# Patient Record
Sex: FEMALE | Race: BLACK OR AFRICAN AMERICAN | Employment: FULL TIME | URBAN - METROPOLITAN AREA
[De-identification: names, ages, dates, MRNs, and addresses within clinical notes are randomized per-mention and may not be internally consistent; named-entity substitution may affect disease eponyms.]

---

## 2017-08-09 ENCOUNTER — HOSPITAL ENCOUNTER (OUTPATIENT)
Dept: NUTRITION | Age: 62
Discharge: HOME | End: 2017-08-09
Payer: COMMERCIAL

## 2017-08-09 PROCEDURE — 97802 MEDICAL NUTRITION INDIV IN: CPT

## 2017-08-09 NOTE — PROGRESS NOTES
[address] [place] [address] 51, 45 [facility], [place], 70 [facility]  Phone: [phone]  Fax: [phone]   Nutrition Assessment - Medical Nutrition Therapy   Outpatient Initial Evaluation         Patient Name: Tina Riedel : 1955   Treatment Diagnosis: Type 2 Diabetes     Referral Source: Carmen aWll MD Start of Care [facility]): 2017     Ht:  64.75 in  cm Wt:  175 lb  kg   BMI: 29.3  BMR   male  BMR female 12     PMHx includes: Hypothyroidism, hyperlipidemia, HTN     Medications of Nutritional Significance:   Losartan, Vitamin D, Mag-oxide, metformin, pioglitazone, rosuvastatin, pantoprazole, levothyroixine     Subjective/Assessment:   62 YO female referred to RD for diabetes. Per BMI, pt is considered overweight. Most recent A1c increased to 6.4%, still within goal for DM. She has had DM for about 5 years and took one education class when she was first diagnosed. Pt reports she has been fairly wt stable. 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 She has a health  who recently sent her exercise videos and pt plans to start them once she receives them. She tries to check her BG every morning, which ranges 125-135, but no other time during the day. Suspect her BG are higher later in the day based on her dietary intake. Pt expressed comprehension, motivation, and compliance is expected. Current Eating Patterns: Pt is currently on summer break, and therefore, she has no routine or schedule. She eats at different times each day. Her meals are very imbalanced and are high in carbs usually, such as 1.5 cups of red beans & rice, half an ear of corn with butter, a handful of cherries and grapes, plus 8 oz of lemonade. Yesterday she had gavin chips with cheese and 16 oz of sweet tea at the movie theaters. She usually drinks lemonade throughout the day too.       Handouts/  Information Provided: [x] Carbohydrates  [x]  Protein  []  Fiber  [x]  Serving Sizes  [x]  Snack Ideas  []  Food Journals [x]  Diabetes  [x]  List of non-starchy vegetables  []  Sodium  [x]  Gen Nutr Guidelines  []  SBGM Guidelines  [x]  Others: Educated pt on the pathophysiology of Type II Diabetes and insulin resistance and the rationale for dietary modification and increased activity. Educated pt on carbohydrate food sources, counting carbohydrates, and meal timing. Discussed the Healthy Plate Method and appropriate portion sizes of different food groups. Explained the importance of combining carbohydrates with protein at meals and reviewed foods that contain each nutrient. Explained calorie density and empty calories to assist pt with understanding portion sizes and limiting excess calories. Created meal/snack time schedule based her schedule. Estimate Needs:   Calories:  1500 Protein: 94 Carbs: 169 Fat: 50   Kcal/day  g/day  g/day  g/day        percent: 25  45  30     Nutritional Goal - To promote lifestyle changes to result in:    [x]  Weight loss  [x]  Improved blood sugar control  []  Improved cholesterol levels  []  Decreased blood pressure  []  Weight maintenance []  Preventing any interactions associated with food allergies  []  Adequate weight gain toward goal weight  []  Other:        Recommendations: - Pt will combine carbohydrates with a protein source at every meal and snack.  - Pt will eat within 2 hours of waking and eat a meal every 4-5 hours while awake. If longer than 5 hours between meals, pt will have a snack. Avoid skipping meals.  - Pt will start exercise videos once they arrive - at least 1 day per week to start.      Information Reviewed with: pt   Potential Barriers to Learning: none     Dietitian Signature: Jesus Gayle RD Date: 8/9/2017   Follow-up: Wed, 9/6/17 @5pm Time: 2:11 PM

## 2017-09-06 ENCOUNTER — APPOINTMENT (OUTPATIENT)
Dept: NUTRITION | Age: 62
End: 2017-09-06

## 2022-10-11 LAB — MAMMOGRAPHY, EXTERNAL: NORMAL

## 2022-12-16 LAB
CREATININE, EXTERNAL: 0.81
HBA1C MFR BLD HPLC: 6.6 %
LDL CHOLESTEROL, EXTERNAL: 110

## 2022-12-17 LAB — MICROALBUMIN URINE, EXTERNAL: <1.2

## 2023-01-09 ENCOUNTER — OFFICE VISIT (OUTPATIENT)
Dept: FAMILY MEDICINE CLINIC | Age: 68
End: 2023-01-09
Payer: MEDICARE

## 2023-01-09 VITALS
HEART RATE: 77 BPM | WEIGHT: 177 LBS | BODY MASS INDEX: 29.49 KG/M2 | SYSTOLIC BLOOD PRESSURE: 133 MMHG | HEIGHT: 65 IN | DIASTOLIC BLOOD PRESSURE: 77 MMHG | TEMPERATURE: 97.7 F | RESPIRATION RATE: 16 BRPM | OXYGEN SATURATION: 98 %

## 2023-01-09 DIAGNOSIS — E78.5 DYSLIPIDEMIA, GOAL LDL BELOW 70: ICD-10-CM

## 2023-01-09 DIAGNOSIS — I10 HTN, GOAL BELOW 140/90: ICD-10-CM

## 2023-01-09 DIAGNOSIS — C50.912 MALIGNANT NEOPLASM OF LEFT FEMALE BREAST, UNSPECIFIED ESTROGEN RECEPTOR STATUS, UNSPECIFIED SITE OF BREAST (HCC): ICD-10-CM

## 2023-01-09 DIAGNOSIS — N20.0 KIDNEY STONES: ICD-10-CM

## 2023-01-09 DIAGNOSIS — E03.9 ACQUIRED HYPOTHYROIDISM: ICD-10-CM

## 2023-01-09 DIAGNOSIS — Z76.89 ESTABLISHING CARE WITH NEW DOCTOR, ENCOUNTER FOR: Primary | ICD-10-CM

## 2023-01-09 DIAGNOSIS — E11.9 DIABETES MELLITUS TYPE 2, NONINSULIN DEPENDENT (HCC): ICD-10-CM

## 2023-01-09 PROCEDURE — 2022F DILAT RTA XM EVC RTNOPTHY: CPT | Performed by: STUDENT IN AN ORGANIZED HEALTH CARE EDUCATION/TRAINING PROGRAM

## 2023-01-09 PROCEDURE — G8536 NO DOC ELDER MAL SCRN: HCPCS | Performed by: STUDENT IN AN ORGANIZED HEALTH CARE EDUCATION/TRAINING PROGRAM

## 2023-01-09 PROCEDURE — 1123F ACP DISCUSS/DSCN MKR DOCD: CPT | Performed by: STUDENT IN AN ORGANIZED HEALTH CARE EDUCATION/TRAINING PROGRAM

## 2023-01-09 PROCEDURE — 1101F PT FALLS ASSESS-DOCD LE1/YR: CPT | Performed by: STUDENT IN AN ORGANIZED HEALTH CARE EDUCATION/TRAINING PROGRAM

## 2023-01-09 PROCEDURE — G8427 DOCREV CUR MEDS BY ELIG CLIN: HCPCS | Performed by: STUDENT IN AN ORGANIZED HEALTH CARE EDUCATION/TRAINING PROGRAM

## 2023-01-09 PROCEDURE — 1090F PRES/ABSN URINE INCON ASSESS: CPT | Performed by: STUDENT IN AN ORGANIZED HEALTH CARE EDUCATION/TRAINING PROGRAM

## 2023-01-09 PROCEDURE — G9899 SCRN MAM PERF RSLTS DOC: HCPCS | Performed by: STUDENT IN AN ORGANIZED HEALTH CARE EDUCATION/TRAINING PROGRAM

## 2023-01-09 PROCEDURE — G8510 SCR DEP NEG, NO PLAN REQD: HCPCS | Performed by: STUDENT IN AN ORGANIZED HEALTH CARE EDUCATION/TRAINING PROGRAM

## 2023-01-09 PROCEDURE — 3017F COLORECTAL CA SCREEN DOC REV: CPT | Performed by: STUDENT IN AN ORGANIZED HEALTH CARE EDUCATION/TRAINING PROGRAM

## 2023-01-09 PROCEDURE — G8417 CALC BMI ABV UP PARAM F/U: HCPCS | Performed by: STUDENT IN AN ORGANIZED HEALTH CARE EDUCATION/TRAINING PROGRAM

## 2023-01-09 PROCEDURE — G8400 PT W/DXA NO RESULTS DOC: HCPCS | Performed by: STUDENT IN AN ORGANIZED HEALTH CARE EDUCATION/TRAINING PROGRAM

## 2023-01-09 PROCEDURE — 3078F DIAST BP <80 MM HG: CPT | Performed by: STUDENT IN AN ORGANIZED HEALTH CARE EDUCATION/TRAINING PROGRAM

## 2023-01-09 PROCEDURE — 99205 OFFICE O/P NEW HI 60 MIN: CPT | Performed by: STUDENT IN AN ORGANIZED HEALTH CARE EDUCATION/TRAINING PROGRAM

## 2023-01-09 PROCEDURE — 3046F HEMOGLOBIN A1C LEVEL >9.0%: CPT | Performed by: STUDENT IN AN ORGANIZED HEALTH CARE EDUCATION/TRAINING PROGRAM

## 2023-01-09 PROCEDURE — 3075F SYST BP GE 130 - 139MM HG: CPT | Performed by: STUDENT IN AN ORGANIZED HEALTH CARE EDUCATION/TRAINING PROGRAM

## 2023-01-09 RX ORDER — METFORMIN HYDROCHLORIDE 1000 MG/1
1000 TABLET ORAL 2 TIMES DAILY WITH MEALS
Qty: 180 TABLET | Refills: 3 | Status: SHIPPED | OUTPATIENT
Start: 2023-01-09 | End: 2023-01-09

## 2023-01-09 RX ORDER — LOSARTAN POTASSIUM AND HYDROCHLOROTHIAZIDE 25; 100 MG/1; MG/1
1 TABLET ORAL DAILY
Qty: 90 TABLET | Refills: 3 | Status: SHIPPED | OUTPATIENT
Start: 2023-01-09 | End: 2023-01-09

## 2023-01-09 RX ORDER — LETROZOLE 2.5 MG/1
2.5 TABLET, FILM COATED ORAL DAILY
COMMUNITY
Start: 2022-11-18

## 2023-01-09 RX ORDER — LEVOTHYROXINE SODIUM 100 UG/1
100 TABLET ORAL
Qty: 90 TABLET | Refills: 1 | Status: SHIPPED | OUTPATIENT
Start: 2023-01-09

## 2023-01-09 RX ORDER — LEVOTHYROXINE SODIUM 100 UG/1
100 TABLET ORAL
Qty: 90 TABLET | Refills: 1 | Status: SHIPPED | OUTPATIENT
Start: 2023-01-09 | End: 2023-01-09

## 2023-01-09 RX ORDER — CHOLECALCIFEROL (VITAMIN D3) 125 MCG
2000 CAPSULE ORAL DAILY
COMMUNITY

## 2023-01-09 RX ORDER — LOSARTAN POTASSIUM AND HYDROCHLOROTHIAZIDE 25; 100 MG/1; MG/1
1 TABLET ORAL DAILY
Qty: 90 TABLET | Refills: 3 | Status: SHIPPED | OUTPATIENT
Start: 2023-01-09

## 2023-01-09 RX ORDER — CALCIUM CARBONATE/VITAMIN D3 500-10/5ML
LIQUID (ML) ORAL
COMMUNITY

## 2023-01-09 RX ORDER — METFORMIN HYDROCHLORIDE 1000 MG/1
1000 TABLET ORAL 2 TIMES DAILY WITH MEALS
Qty: 180 TABLET | Refills: 3 | Status: SHIPPED | OUTPATIENT
Start: 2023-01-09

## 2023-01-09 NOTE — PROGRESS NOTES
Evie Hardy is a 74 y.o. female (: 1955) presenting to address:    Chief Complaint   Patient presents with    Establish Care       Vitals:    23 1324   BP: 133/77   Pulse: 77   Resp: 16   Temp: 97.7 °F (36.5 °C)   TempSrc: Temporal   SpO2: 98%   Weight: 177 lb (80.3 kg)   Height: 5' 5\" (1.651 m)   PainSc:   0 - No pain       Hearing/Vision:   No results found. Learning Assessment:     Learning Assessment 2023   PRIMARY LEARNER Patient   HIGHEST LEVEL OF EDUCATION - PRIMARY LEARNER  2 YEARS OF COLLEGE   BARRIERS PRIMARY LEARNER NONE   CO-LEARNER CAREGIVER No   PRIMARY LANGUAGE ENGLISH   LEARNER PREFERENCE PRIMARY VIDEOS     LISTENING   ANSWERED BY Patient   RELATIONSHIP SELF     Depression Screening:     3 most recent PHQ Screens 2023   Little interest or pleasure in doing things Not at all   Feeling down, depressed, irritable, or hopeless Not at all   Total Score PHQ 2 0     Fall Risk Assessment:     Fall Risk Assessment, last 12 mths 2023   Able to walk? Yes   Fall in past 12 months? 0   Do you feel unsteady? 0   Are you worried about falling 0     Abuse Screening:     Abuse Screening Questionnaire 2023   Do you ever feel afraid of your partner? N   Are you in a relationship with someone who physically or mentally threatens you? N   Is it safe for you to go home? Y     ADL Assessment:   No flowsheet data found. Coordination of Care Questionaire:   1. \"Have you been to the ER, urgent care clinic since your last visit? Hospitalized since your last visit? \" No    2. \"Have you seen or consulted any other health care providers outside of the [address] [place] [address] since your last visit? \" No     3. For patients aged 39-70: Has the patient had a colonoscopy / FIT/ Cologuard? No      If the patient is female:    4. For patients aged 41-77: Has the patient had a mammogram within the past 2 years? Yes - no Care Gap present  See top three    5.  For patients aged 21-65: Has the patient had a pap smear? No    Advanced Directive:   1. Do you have an Advanced Directive? NO    2. Would you like information on Advanced Directives?  NO

## 2023-01-09 NOTE — PATIENT INSTRUCTIONS
I think this is an opportunity to consider adding on a treatment for diabetes called GLP 1 agonist. Some medications in this group have been shown to have good effect on A1C but more importantly have also shown cardiovascular, weight loss, and renal protection benefits. Every insurance is different and they may request we use a different medicine from this class but I can send a script for you to the pharmacy if you'd like to start. All but one (Rybelsus) of this group of medications are injectables, however most are only once weekly. You can alternate the injection sites (see image below.) If you have any questions or concerns we can schedule a visit to review. Choosing your injection site  Be sure to use a different spot in that area every time        If you experience nausea, you may find the following tips helpful:    Eat smaller meals  Try splitting your 3 daily meals into 4 or more smaller ones  Stop eating when you feel full  Avoid fat or fatty foods  Try eating bland foods like toast, crackers, or rice    Diabetes drugs in the GLP-1 agonists class include:  Dulaglutide (Trulicity), taken by injection weekly  Exenatide extended release (Bydureon), taken by injection weekly  Exenatide (Byetta), taken by injection twice daily  Semaglutide (Ozempic), taken by injection weekly  Semaglutide (Rybelsus), taken by mouth once daily  Liraglutide (Victoza), taken by injection daily  Lixisenatide (Adlyxin), taken by injection daily     . The Big Picture: Checking Your Blood Sugar    Blood sugar (blood glucose) monitoring is the primary tool you have to find out if your blood glucose levels are within your target range. This tells you your blood glucose level at any one time. Its important for blood sugar levels to stay in a healthy range. If glucose levels get too low, we can lose the ability to think and function normally.  If they get too high and stay high, it can cause damage or complications to the body over the course of many years. The logging of your results is vital. When you bring your log to your healthcare provider, youll  have a good picture of your body's response to your diabetes care plan. To help keep track of your levels, we have a printable blood glucose log. We also have a blood glucose log available for purchase that is smaller so you can carry it with you. Who should check? Talk to your doctor about whether you should be checking your blood glucose. People who may benefit from checking blood glucose regularly include those:  taking insulin. who are pregnant.  having a hard time controlling blood sugar levels. having low blood sugar levels. having low blood sugar levels without the usual warning signs. have ketones from high blood sugar levels. Other tips for checking:    With some meters, you can also use your forearm, thigh, or fleshy part of your hand. There are spring-loaded lancing devices that make sticking yourself less painful. If you use your fingertip, stick the side of your fingertip by your fingernail to avoid having sore spots on the frequently used part of your finger. What are the target ranges? A1C targets differ based on age and health. Also, more or less stringent glycemic goals may be appropriate for each individual. Blood glucose targets are individualized based on:  duration of diabetes  age/life expectancy  conditions a person may have  cardiovascular disease or diabetes complications  hypoglycemia unawareness  individual patient considerations    The American Diabetes Association suggests the following targets for most nonpregnant adults with diabetes:    A1C: Less than 7%. Before a meal (preprandial plasma glucose):  mg/dL    1-2 hours after beginning of the meal (postprandial plasma glucose)*: Less than 180 mg/dL    What do my results mean?     When you finish the blood sugar check, write down your results and note what factors may have affected them, such as food, activity, and stress. Take a close look at your blood glucose record to see if your level is too high or too low several days in a row at about the same time. If the same thing keeps happening, it might be time to change your diabetes care plan. Work with your doctor or diabetes educator to learn what your results mean for you. It can take time to make adjustments and get things just right. And do ask your doctor if you should report results out of a certain range right away by phone. Keep in mind that blood glucose results often trigger strong feelings. Blood sugar numbers can leave you upset, confused, frustrated, angry, or down. It's easy to use the numbers to  yourself.  Remind yourself that tracking your blood sugar level is simply a way to know how well your diabetes care plan is working, and whether that plan may need to change. : )

## 2023-01-09 NOTE — PROGRESS NOTES
Yovani Mckenzie (: 1955) is a 74 y.o. female, NEW TO PROVIDER, here to establish care for:    ICD-10-CM ICD-9-CM   1. Establishing care with new doctor, encounter for  ZRene.Fredy V65.8   2. Diabetes mellitus type 2, noninsulin dependent (HCC)  E11.9 250.00   3. HTN, goal below 140/90  I10 401.9   4. Acquired hypothyroidism  E03.9 244.9   5. Malignant neoplasm of left female breast, unspecified estrogen receptor status, unspecified site of breast (HCC)  C50.912 174.9   6. Kidney stones  N20.0 592.0   7. Dyslipidemia, goal LDL below 70  E78.5 272.4     Assessment   Plan     Established with Specialist for #L Breast CA dx  - s/p L mastectomy; No chemo/radiation  Follows q3mo and on Letrozole     #HTN - Goal BP <140/90  #Kidney stones   Previously seen by Cardiology   Rec review Echo 2017 EF 70%, mild aortic sclerosis no stenosis, abnormal diastolic filling   Interested in reduction of pills; Plan to switch to Losartan-HCTZ combo, increase HCTZ dose (reduce kidney stone risk), and D/C Amlodipine   Future consideration: Reevaluate BB need    BP Readings from Last 3 Encounters:   23 133/77     Key CAD CHF Meds               losartan-hydroCHLOROthiazide (HYZAAR) 100-25 mg per tablet (Taking) Take 1 Tablet by mouth daily. Indications: high blood pressure, kidney protection    atenoloL (TENORMIN) 50 mg tablet (Taking) atenolol 50 mg tablet   Take 1 tablet every day by oral route. aspirin 81 mg chewable tablet (Taking) Take 81 mg by mouth daily. rosuvastatin (CRESTOR) 5 mg tablet (Taking)           #HLD - Goal LDL < 70  Unclear control, will check levels'  Tried multiple statins that caused leg cramping and had to stop; Interested in ruling out statin contributing to myalgias, plan to reduce dose to qOther day, monitor, and reintroduce)   Future considerations: Add on zetia    #NIDDM2 - Goal A1C <7  Glucometer? Has at home  Hypoglycemia?  Denies  Interested in starting GLP agonist depending on coverage. Plan to send Ozempic and titrate qMonthly until max tolerated dose. Reviewed can DC pioglitazone when able to start GLP agonist. Back up option of Trulicity then SGLT2 inhibitor if needed. Encouraged to have annual foot exam to r/o peripheral neuropathy. Encouraged to update dilated eye exam annually to r/o retinopathy. Key Antihyperglycemic Medications               metFORMIN (GLUCOPHAGE) 1,000 mg tablet (Taking) Take 1 Tablet by mouth two (2) times daily (with meals). Indications: type 2 diabetes mellitus    semaglutide (OZEMPIC) 0.25 mg or 0.5 mg/dose (2 mg/1.5 ml) subq pen (Taking) Initial: 0.25 mg once weekly for 4 weeks, then increase to 0.5 mg once weekly; If changing the day of administration is necessary, allow at least 48 hrs between doses. Indications: type 2 diabetes mellitus    pioglitazone (ACTOS) 15 mg tablet (Taking) pioglitazone 15 mg tablet   TAKE 1 TABLET BY MOUTH EVERY DAY AT BEDTIME          #Hypothyroidism  Unclear control, will check levels  Tolerating medications without notable AE, will continue regimen unchanged    HM  Colon CA screening - Scheduled for Colonoscopy         Orders Placed This Encounter    letrozole (FEMARA) 2.5 mg tablet     Sig: Take 2.5 mg by mouth daily. cholecalciferol, vitamin D3, 50 mcg (2,000 unit) tab     Sig: Take 2,000 Units by mouth daily. magnesium oxide 400 mg magnesium cap     Sig: Take  by mouth. DISCONTD: semaglutide (OZEMPIC) 0.25 mg or 0.5 mg/dose (2 mg/1.5 ml) subq pen     Sig: Initial: 0.25 mg once weekly for 4 weeks, then increase to 0.5 mg once weekly; If changing the day of administration is necessary, allow at least 48 hrs between doses. Indications: type 2 diabetes mellitus     Dispense:  1 Box     Refill:  0     Will increase dose with next refill if tolerating well. If cost is too high, I will send Trulicity.     DISCONTD: metFORMIN (GLUCOPHAGE) 1,000 mg tablet     Sig: Take 1 Tablet by mouth two (2) times daily (with meals). Indications: type 2 diabetes mellitus     Dispense:  180 Tablet     Refill:  3     Patient newly established with me as PCP for chronic meds    DISCONTD: levothyroxine (SYNTHROID) 100 mcg tablet     Sig: Take 1 Tablet by mouth Daily (before breakfast). Indications: a condition with low thyroid hormone levels     Dispense:  90 Tablet     Refill:  1    DISCONTD: losartan-hydroCHLOROthiazide (HYZAAR) 100-25 mg per tablet     Sig: Take 1 Tablet by mouth daily. Indications: high blood pressure, kidney protection     Dispense:  90 Tablet     Refill:  3    levothyroxine (SYNTHROID) 100 mcg tablet     Sig: Take 1 Tablet by mouth Daily (before breakfast). Indications: a condition with low thyroid hormone levels     Dispense:  90 Tablet     Refill:  1    losartan-hydroCHLOROthiazide (HYZAAR) 100-25 mg per tablet     Sig: Take 1 Tablet by mouth daily. Indications: high blood pressure, kidney protection     Dispense:  90 Tablet     Refill:  3    metFORMIN (GLUCOPHAGE) 1,000 mg tablet     Sig: Take 1 Tablet by mouth two (2) times daily (with meals). Indications: type 2 diabetes mellitus     Dispense:  180 Tablet     Refill:  3     Patient newly established with me as PCP for chronic meds    semaglutide (OZEMPIC) 0.25 mg or 0.5 mg/dose (2 mg/1.5 ml) subq pen     Sig: Initial: 0.25 mg once weekly for 4 weeks, then increase to 0.5 mg once weekly; If changing the day of administration is necessary, allow at least 48 hrs between doses. Indications: type 2 diabetes mellitus     Dispense:  1 Box     Refill:  0     Will increase dose with next refill if tolerating well. If cost is too high, I will send [facility]. Return in about 2 months (around 3/9/2023) for labs 1 week prior or AT visit, bring all meds, 45 min follow up. Subjective   Extensive review of medical history and medications completed to facilitate transfer of care.       Current Outpatient Medications   Medication Instructions    acyclovir (ZOVIRAX) 400 mg, Oral, DAILY    aspirin 81 mg, Oral, DAILY    atenoloL (TENORMIN) 50 mg tablet atenolol 50 mg tablet   Take 1 tablet every day by oral route. calcium carbonate (TUMS) 500 mg, Oral    cholecalciferol (vitamin D3) 2,000 Units, Oral, DAILY    ibuprofen (MOTRIN) 800 mg tablet ibuprofen 800 mg tablet   TAKE 1 TABLET BY MOUTH THREE TIMES DAILY AS NEEDED    letrozole (FEMARA) 2.5 mg, Oral, DAILY    levothyroxine (SYNTHROID) 100 mcg, Oral, DAILY BEFORE BREAKFAST    losartan-hydroCHLOROthiazide (HYZAAR) 100-25 mg per tablet 1 Tablet, Oral, DAILY    magnesium oxide 400 mg magnesium cap Oral    metFORMIN (GLUCOPHAGE) 1,000 mg, Oral, 2 TIMES DAILY WITH MEALS    pioglitazone (ACTOS) 15 mg tablet pioglitazone 15 mg tablet   TAKE 1 TABLET BY MOUTH EVERY DAY AT BEDTIME    potassium chloride SR (KLOR-CON 10) 10 mEq tablet potassium chloride ER 10 mEq tablet,extended release   Take 1 tablet every day by oral route. rosuvastatin (CRESTOR) 5 mg tablet No dose, route, or frequency recorded. semaglutide (OZEMPIC) 0.25 mg or 0.5 mg/dose (2 mg/1.5 ml) subq pen Initial: 0.25 mg once weekly for 4 weeks, then increase to 0.5 mg once weekly; If changing the day of administration is necessary, allow at least 48 hrs between doses.       Allergies   Allergen Reactions    Statins-Hmg-Coa Reductase Inhibitors Other (comments) and Palpitations    Colesevelam Other (comments)    Corticosteroids (Glucocorticoids) Other (comments)    Ezetimibe Other (comments)    Moxifloxacin Other (comments)    Naproxen Other (comments)    Nsaids (Non-Steroidal Anti-Inflammatory Drug) Other (comments)    Qcotsdcdc-Itmrnrjjhh-Wq Hives    Phenylephrine-Guaifenesin Itching    Sitagliptin Other (comments)      Objective   Visit Vitals  /77 (BP 1 Location: Right arm, BP Patient Position: Sitting, BP Cuff Size: Adult)   Pulse 77   Temp 97.7 °F (36.5 °C) (Temporal)   Resp 16   Ht 5' 5\" (1.651 m)   Wt 177 lb (80.3 kg)   SpO2 98%   BMI 29.45 kg/m²     Physical Exam  Vitals and nursing note reviewed. Constitutional:       General: She is not in acute distress. Cardiovascular:      Rate and Rhythm: Normal rate. Pulses: Normal pulses. Pulmonary:      Effort: Pulmonary effort is normal. No respiratory distress. Neurological:      Mental Status: She is alert and oriented to person, place, and time. Gait: Gait normal.   Psychiatric:         Mood and Affect: Mood normal.         Behavior: Behavior normal.        On this date 01/09/2023 I have spent 65 minutes reviewing previous notes, test results and face to face with the patient discussing the diagnosis and importance of compliance with the treatment plan as well as documenting on the day of the visit.   Livan Bingham, DO  Family Medicine  01/09/2023

## 2023-01-12 ENCOUNTER — TELEPHONE (OUTPATIENT)
Dept: FAMILY MEDICINE CLINIC | Age: 68
End: 2023-01-12

## 2023-01-12 DIAGNOSIS — E78.5 DYSLIPIDEMIA, GOAL LDL BELOW 70: ICD-10-CM

## 2023-01-12 DIAGNOSIS — I10 HTN, GOAL BELOW 140/90: ICD-10-CM

## 2023-01-12 DIAGNOSIS — E11.9 DIABETES MELLITUS TYPE 2, NONINSULIN DEPENDENT (HCC): Primary | ICD-10-CM

## 2023-01-12 NOTE — TELEPHONE ENCOUNTER
Patient called stating that she was told to reach out to office if her medication was too costly. patient is requesting an alternative medication be sent for Ozempic. In Script notes Trulicity would be the alternative.   Future Appointments   Date Time Provider Aleks Roblero   3/9/2023  1:00 PM DO MARIE Allen BS AMB     Last seen 01/09/23

## 2023-01-18 ENCOUNTER — HOSPITAL ENCOUNTER (OUTPATIENT)
Dept: LAB | Age: 68
Discharge: HOME | End: 2023-01-18
Payer: MEDICARE

## 2023-01-18 ENCOUNTER — APPOINTMENT (OUTPATIENT)
Dept: FAMILY MEDICINE CLINIC | Age: 68
End: 2023-01-18

## 2023-01-18 ENCOUNTER — OFFICE VISIT (OUTPATIENT)
Dept: FAMILY MEDICINE CLINIC | Age: 68
End: 2023-01-18
Payer: MEDICARE

## 2023-01-18 VITALS
TEMPERATURE: 97.6 F | RESPIRATION RATE: 18 BRPM | HEIGHT: 65 IN | DIASTOLIC BLOOD PRESSURE: 80 MMHG | WEIGHT: 177 LBS | HEART RATE: 61 BPM | SYSTOLIC BLOOD PRESSURE: 172 MMHG | BODY MASS INDEX: 29.49 KG/M2 | OXYGEN SATURATION: 100 %

## 2023-01-18 DIAGNOSIS — Z11.59 NEED FOR HEPATITIS C SCREENING TEST: ICD-10-CM

## 2023-01-18 DIAGNOSIS — Z00.00 WELCOME TO MEDICARE PREVENTIVE VISIT: ICD-10-CM

## 2023-01-18 DIAGNOSIS — N20.0 KIDNEY STONES: ICD-10-CM

## 2023-01-18 DIAGNOSIS — E78.5 DYSLIPIDEMIA, GOAL LDL BELOW 70: ICD-10-CM

## 2023-01-18 DIAGNOSIS — Z76.89 ESTABLISHING CARE WITH NEW DOCTOR, ENCOUNTER FOR: ICD-10-CM

## 2023-01-18 DIAGNOSIS — E11.9 DIABETES MELLITUS TYPE 2, NONINSULIN DEPENDENT (HCC): Primary | ICD-10-CM

## 2023-01-18 DIAGNOSIS — E03.9 ACQUIRED HYPOTHYROIDISM: ICD-10-CM

## 2023-01-18 DIAGNOSIS — C50.912 MALIGNANT NEOPLASM OF LEFT FEMALE BREAST, UNSPECIFIED ESTROGEN RECEPTOR STATUS, UNSPECIFIED SITE OF BREAST (HCC): ICD-10-CM

## 2023-01-18 DIAGNOSIS — E11.9 DIABETES MELLITUS TYPE 2, NONINSULIN DEPENDENT (HCC): ICD-10-CM

## 2023-01-18 DIAGNOSIS — I10 HTN, GOAL BELOW 140/90: ICD-10-CM

## 2023-01-18 LAB
ALBUMIN SERPL-MCNC: 3.8 G/DL (ref 3.4–5)
ALBUMIN/GLOB SERPL: 1.1 (ref 0.8–1.7)
ALP SERPL-CCNC: 98 U/L (ref 45–117)
ALT SERPL-CCNC: 17 U/L (ref 13–56)
ANION GAP SERPL CALC-SCNC: 6 MMOL/L (ref 3–18)
AST SERPL-CCNC: 11 U/L (ref 10–38)
BASOPHILS # BLD: 0.1 K/UL (ref 0–0.1)
BASOPHILS NFR BLD: 1 % (ref 0–2)
BILIRUB SERPL-MCNC: 0.9 MG/DL (ref 0.2–1)
BUN SERPL-MCNC: 18 MG/DL (ref 7–18)
BUN/CREAT SERPL: 18 (ref 12–20)
CALCIUM SERPL-MCNC: 9.9 MG/DL (ref 8.5–10.1)
CHLORIDE SERPL-SCNC: 103 MMOL/L (ref 100–111)
CHOLEST SERPL-MCNC: 169 MG/DL
CO2 SERPL-SCNC: 30 MMOL/L (ref 21–32)
CREAT SERPL-MCNC: 1 MG/DL (ref 0.6–1.3)
DIFFERENTIAL METHOD BLD: ABNORMAL
EOSINOPHIL # BLD: 0.2 K/UL (ref 0–0.4)
EOSINOPHIL NFR BLD: 3 % (ref 0–5)
ERYTHROCYTE [DISTWIDTH] IN BLOOD BY AUTOMATED COUNT: 13.4 % (ref 11.6–14.5)
EST. AVERAGE GLUCOSE BLD GHB EST-MCNC: 146 MG/DL
GLOBULIN SER CALC-MCNC: 3.5 G/DL (ref 2–4)
GLUCOSE SERPL-MCNC: 189 MG/DL (ref 74–99)
HBA1C MFR BLD: 6.7 % (ref 4.2–5.6)
HCT VFR BLD AUTO: 38.7 % (ref 35–45)
HDLC SERPL-MCNC: 68 MG/DL (ref 40–60)
HDLC SERPL: 2.5 (ref 0–5)
HGB BLD-MCNC: 13.1 G/DL (ref 12–16)
IMM GRANULOCYTES # BLD AUTO: 0 K/UL (ref 0–0.04)
IMM GRANULOCYTES NFR BLD AUTO: 1 % (ref 0–0.5)
LDLC SERPL CALC-MCNC: 83 MG/DL (ref 0–100)
LIPID PROFILE,FLP: ABNORMAL
LYMPHOCYTES # BLD: 1.6 K/UL (ref 0.9–3.6)
LYMPHOCYTES NFR BLD: 25 % (ref 21–52)
MCH RBC QN AUTO: 28.4 PG (ref 24–34)
MCHC RBC AUTO-ENTMCNC: 33.9 G/DL (ref 31–37)
MCV RBC AUTO: 83.9 FL (ref 78–100)
MONOCYTES # BLD: 0.7 K/UL (ref 0.05–1.2)
MONOCYTES NFR BLD: 11 % (ref 3–10)
NEUTS SEG # BLD: 3.8 K/UL (ref 1.8–8)
NEUTS SEG NFR BLD: 60 % (ref 40–73)
NRBC # BLD: 0 K/UL (ref 0–0.01)
NRBC BLD-RTO: 0 PER 100 WBC
PLATELET # BLD AUTO: 163 K/UL (ref 135–420)
PMV BLD AUTO: 13.6 FL (ref 9.2–11.8)
POTASSIUM SERPL-SCNC: 3.7 MMOL/L (ref 3.5–5.5)
PROT SERPL-MCNC: 7.3 G/DL (ref 6.4–8.2)
RBC # BLD AUTO: 4.61 M/UL (ref 4.2–5.3)
SODIUM SERPL-SCNC: 139 MMOL/L (ref 136–145)
T4 FREE SERPL-MCNC: 1.6 NG/DL (ref 0.7–1.5)
TRIGL SERPL-MCNC: 90 MG/DL (ref ?–150)
TSH SERPL DL<=0.05 MIU/L-ACNC: 0.52 UIU/ML (ref 0.36–3.74)
VLDLC SERPL CALC-MCNC: 18 MG/DL
WBC # BLD AUTO: 6.4 K/UL (ref 4.6–13.2)

## 2023-01-18 PROCEDURE — 3017F COLORECTAL CA SCREEN DOC REV: CPT | Performed by: STUDENT IN AN ORGANIZED HEALTH CARE EDUCATION/TRAINING PROGRAM

## 2023-01-18 PROCEDURE — 85025 COMPLETE CBC W/AUTO DIFF WBC: CPT

## 2023-01-18 PROCEDURE — G9899 SCRN MAM PERF RSLTS DOC: HCPCS | Performed by: STUDENT IN AN ORGANIZED HEALTH CARE EDUCATION/TRAINING PROGRAM

## 2023-01-18 PROCEDURE — G0402 INITIAL PREVENTIVE EXAM: HCPCS | Performed by: STUDENT IN AN ORGANIZED HEALTH CARE EDUCATION/TRAINING PROGRAM

## 2023-01-18 PROCEDURE — 80053 COMPREHEN METABOLIC PANEL: CPT

## 2023-01-18 PROCEDURE — 86803 HEPATITIS C AB TEST: CPT

## 2023-01-18 PROCEDURE — 3044F HG A1C LEVEL LT 7.0%: CPT | Performed by: STUDENT IN AN ORGANIZED HEALTH CARE EDUCATION/TRAINING PROGRAM

## 2023-01-18 PROCEDURE — G8400 PT W/DXA NO RESULTS DOC: HCPCS | Performed by: STUDENT IN AN ORGANIZED HEALTH CARE EDUCATION/TRAINING PROGRAM

## 2023-01-18 PROCEDURE — G8536 NO DOC ELDER MAL SCRN: HCPCS | Performed by: STUDENT IN AN ORGANIZED HEALTH CARE EDUCATION/TRAINING PROGRAM

## 2023-01-18 PROCEDURE — 1101F PT FALLS ASSESS-DOCD LE1/YR: CPT | Performed by: STUDENT IN AN ORGANIZED HEALTH CARE EDUCATION/TRAINING PROGRAM

## 2023-01-18 PROCEDURE — 2022F DILAT RTA XM EVC RTNOPTHY: CPT | Performed by: STUDENT IN AN ORGANIZED HEALTH CARE EDUCATION/TRAINING PROGRAM

## 2023-01-18 PROCEDURE — G8510 SCR DEP NEG, NO PLAN REQD: HCPCS | Performed by: STUDENT IN AN ORGANIZED HEALTH CARE EDUCATION/TRAINING PROGRAM

## 2023-01-18 PROCEDURE — 1090F PRES/ABSN URINE INCON ASSESS: CPT | Performed by: STUDENT IN AN ORGANIZED HEALTH CARE EDUCATION/TRAINING PROGRAM

## 2023-01-18 PROCEDURE — G8427 DOCREV CUR MEDS BY ELIG CLIN: HCPCS | Performed by: STUDENT IN AN ORGANIZED HEALTH CARE EDUCATION/TRAINING PROGRAM

## 2023-01-18 PROCEDURE — 36415 COLL VENOUS BLD VENIPUNCTURE: CPT

## 2023-01-18 PROCEDURE — 84443 ASSAY THYROID STIM HORMONE: CPT

## 2023-01-18 PROCEDURE — 3078F DIAST BP <80 MM HG: CPT | Performed by: STUDENT IN AN ORGANIZED HEALTH CARE EDUCATION/TRAINING PROGRAM

## 2023-01-18 PROCEDURE — 1123F ACP DISCUSS/DSCN MKR DOCD: CPT | Performed by: STUDENT IN AN ORGANIZED HEALTH CARE EDUCATION/TRAINING PROGRAM

## 2023-01-18 PROCEDURE — 84439 ASSAY OF FREE THYROXINE: CPT

## 2023-01-18 PROCEDURE — 99214 OFFICE O/P EST MOD 30 MIN: CPT | Performed by: STUDENT IN AN ORGANIZED HEALTH CARE EDUCATION/TRAINING PROGRAM

## 2023-01-18 PROCEDURE — G8417 CALC BMI ABV UP PARAM F/U: HCPCS | Performed by: STUDENT IN AN ORGANIZED HEALTH CARE EDUCATION/TRAINING PROGRAM

## 2023-01-18 PROCEDURE — 83036 HEMOGLOBIN GLYCOSYLATED A1C: CPT

## 2023-01-18 PROCEDURE — 80061 LIPID PANEL: CPT

## 2023-01-18 PROCEDURE — 3074F SYST BP LT 130 MM HG: CPT | Performed by: STUDENT IN AN ORGANIZED HEALTH CARE EDUCATION/TRAINING PROGRAM

## 2023-01-18 NOTE — PATIENT INSTRUCTIONS
Can reduce use of Crestor to 5mg every other night to see if stiffness or leg cramping improves    Blood pressure goals: If blood pressure measurements at home are close to or higher than 140/90 frequently come in sooner for evaluation and consideration of adjustment of blood pressure medication. The Big Picture: Checking Your Blood Sugar    Blood sugar (blood glucose) monitoring is the primary tool you have to find out if your blood glucose levels are within your target range. This tells you your blood glucose level at any one time. Its important for blood sugar levels to stay in a healthy range. If glucose levels get too low, we can lose the ability to think and function normally. If they get too high and stay high, it can cause damage or complications to the body over the course of many years. The logging of your results is vital. When you bring your log to your healthcare provider, youll  have a good picture of your body's response to your diabetes care plan. To help keep track of your levels, we have a printable blood glucose log. We also have a blood glucose log available for purchase that is smaller so you can carry it with you. Who should check? Talk to your doctor about whether you should be checking your blood glucose. People who may benefit from checking blood glucose regularly include those:  taking insulin. who are pregnant.  having a hard time controlling blood sugar levels. having low blood sugar levels. having low blood sugar levels without the usual warning signs. have ketones from high blood sugar levels. Other tips for checking:    With some meters, you can also use your forearm, thigh, or fleshy part of your hand. There are spring-loaded lancing devices that make sticking yourself less painful. If you use your fingertip, stick the side of your fingertip by your fingernail to avoid having sore spots on the frequently used part of your finger.     What are the target ranges? A1C targets differ based on age and health. Also, more or less stringent glycemic goals may be appropriate for each individual. Blood glucose targets are individualized based on:  duration of diabetes  age/life expectancy  conditions a person may have  cardiovascular disease or diabetes complications  hypoglycemia unawareness  individual patient considerations    The American Diabetes Association suggests the following targets for most nonpregnant adults with diabetes:    A1C: Less than 7-7.5%    Before a meal (preprandial plasma glucose):  mg/dL    1-2 hours after beginning of the meal (postprandial plasma glucose)*: Less than 200 mg/dL    What do my results mean? When you finish the blood sugar check, write down your results and note what factors may have affected them, such as food, activity, and stress. Take a close look at your blood glucose record to see if your level is too high or too low several days in a row at about the same time. If the same thing keeps happening, it might be time to change your diabetes care plan. Work with your doctor or diabetes educator to learn what your results mean for you. It can take time to make adjustments and get things just right. And do ask your doctor if you should report results out of a certain range right away by phone. Keep in mind that blood glucose results often trigger strong feelings. Blood sugar numbers can leave you upset, confused, frustrated, angry, or down. It's easy to use the numbers to  yourself.  Remind yourself that tracking your blood sugar level is simply a way to know how well your diabetes care plan is working, and whether that plan may need to change. : )

## 2023-01-18 NOTE — PROGRESS NOTES
Zo Kim (: 1955) is a 74 y.o. female, ESTABLISHED patient, here for FOLLOW UP:    ICD-10-CM ICD-9-CM   1. Diabetes mellitus type 2, noninsulin dependent (HCC)  E11.9 250.00   2. Need for hepatitis C screening test  Z11.59 V73.89   3. Dyslipidemia, goal LDL below 70  E78.5 272.4   4. HTN, goal below 140/90  I10 401.9   5. Acquired hypothyroidism  E03.9 244.9   6. Malignant neoplasm of left female breast, unspecified estrogen receptor status, unspecified site of breast (HCC)  C50.912 174.9   7. Kidney stones  N20.0 592.0   8. Welcome to Medicare preventive visit  Z00.00 V70.0     Assessment   Plan     Established with Specialist for #L Breast CA dx  - s/p L mastectomy; No chemo/radiation  Follows q3mo and on Letrozole     #HTN - Goal BP <140/90  #Kidney stones - Reviewed dx and recommended dietary changes to reduce recurrence. Previously seen by Cardiology   Rec review Echo 2017 EF 70%, mild aortic sclerosis no stenosis, abnormal diastolic filling   Tolerating medications without notable AE, will continue regimen unchanged    BP Readings from Last 3 Encounters:   23 (!) 172/80   23 133/77     Key CAD CHF Meds               losartan-hydroCHLOROthiazide (HYZAAR) 100-25 mg per tablet (Taking) Take 1 Tablet by mouth daily. Indications: high blood pressure, kidney protection    atenoloL (TENORMIN) 50 mg tablet (Taking) atenolol 50 mg tablet   Take 1 tablet every day by oral route. aspirin 81 mg chewable tablet (Taking) Take 81 mg by mouth daily. rosuvastatin (CRESTOR) 5 mg tablet (Taking)           #HLD - Goal LDL < 70  Unclear control, will check levels'  Tried multiple statins that caused leg cramping and had to stop; Interested in ruling out statin contributing to myalgias, plan to reduce dose to qOther day, monitor, and reintroduce)   Future considerations: Add on zetia    #NIDDM2 - Goal A1C <7  Glucometer? Has at home  Hypoglycemia?  Denies  Interested in starting [employer] agonist depending on coverage. Plan to send Ozempic and titrate qMonthly until max tolerated dose. Reviewed can DC pioglitazone when able to start GLP agonist. Back up option of Trulicity then SGLT2 inhibitor if needed. Encouraged to have annual foot exam to r/o peripheral neuropathy. Encouraged to update dilated eye exam annually to r/o retinopathy. Unclear control, will check levels    Key Antihyperglycemic Medications               metFORMIN (GLUCOPHAGE) 1,000 mg tablet (Taking) Take 1 Tablet by mouth two (2) times daily (with meals). Indications: type 2 diabetes mellitus    pioglitazone (ACTOS) 15 mg tablet (Taking) pioglitazone 15 mg tablet   TAKE 1 TABLET BY MOUTH EVERY DAY AT BEDTIME    semaglutide (OZEMPIC) 0.25 mg or 0.5 mg/dose (2 mg/1.5 ml) subq pen (Discontinued) Initial: 0.25 mg once weekly for 4 weeks, then increase to 0.5 mg once weekly; If changing the day of administration is necessary, allow at least 48 hrs between doses. Indications: type 2 diabetes mellitus          #Hypothyroidism  Unclear control, will check levels  Tolerating medications without notable AE, will continue regimen unchanged    HM  Colon CA screening - Scheduled for Colonoscopy           Orders Placed This Encounter    HEPATITIS C AB     Standing Status:   Future     Number of Occurrences:   1     Standing Expiration Date:   1/18/2024    MICROALBUMIN, UR, RAND W/ MICROALB/CREAT RATIO     Standing Status:   Future     Standing Expiration Date:   2/7/2024     Return for visit as already scheduled (plan to schedule nurse visit in office for first Ozempic dose) . Subjective   Last PCP visit: 1/9/2023  See A/P     Current Outpatient Medications   Medication Instructions    acyclovir (ZOVIRAX) 400 mg, Oral, DAILY    aspirin 81 mg, Oral, DAILY    atenoloL (TENORMIN) 50 mg tablet atenolol 50 mg tablet   Take 1 tablet every day by oral route.     calcium carbonate (TUMS) 500 mg, Oral    cholecalciferol (vitamin D3) 2,000 Units, Oral, DAILY    dulaglutide (TRULICITY) 5.79 EW/1.2 mL sub-q pen Starter dose: Inject 0.75 mg subcutaneously once weekly; Will increase to 1.5 mg once weekly after 4 to 8 weeks . dulaglutide (TRULICITY) 1.5 mg, SubCUTAneous, EVERY 7 DAYS, After finishing starter dose can use this. ibuprofen (MOTRIN) 800 mg tablet ibuprofen 800 mg tablet   TAKE 1 TABLET BY MOUTH THREE TIMES DAILY AS NEEDED    lancets (One Touch Delica) 33 gauge misc Use to prick finger to check glucose levels up to four times daily. letrozole (FEMARA) 2.5 mg, Oral, DAILY    levothyroxine (SYNTHROID) 100 mcg, Oral, DAILY BEFORE BREAKFAST    losartan-hydroCHLOROthiazide (HYZAAR) 100-25 mg per tablet 1 Tablet, Oral, DAILY    magnesium oxide 400 mg magnesium cap Oral    metFORMIN (GLUCOPHAGE) 1,000 mg, Oral, 2 TIMES DAILY WITH MEALS    pioglitazone (ACTOS) 15 mg tablet pioglitazone 15 mg tablet   TAKE 1 TABLET BY MOUTH EVERY DAY AT BEDTIME    potassium chloride SR (KLOR-CON 10) 10 mEq tablet potassium chloride ER 10 mEq tablet,extended release   Take 1 tablet every day by oral route. rosuvastatin (CRESTOR) 5 mg tablet No dose, route, or frequency recorded. Objective   Visit Vitals  BP (!) 172/80 (BP 1 Location: Right arm, BP Patient Position: Sitting, BP Cuff Size: Adult)   Pulse 61   Temp 97.6 °F (36.4 °C) (Temporal)   Resp 18   Ht 5' 5\" (1.651 m)   Wt 177 lb (80.3 kg)   SpO2 100%   BMI 29.45 kg/m²     Physical Exam  Vitals and nursing note reviewed. Constitutional:       General: She is not in acute distress. Cardiovascular:      Rate and Rhythm: Normal rate. Pulses: Normal pulses. Pulmonary:      Effort: Pulmonary effort is normal. No respiratory distress. Neurological:      Mental Status: She is alert and oriented to person, place, and time.       Gait: Gait normal.   Psychiatric:         Mood and Affect: Mood normal.         Behavior: Behavior normal.          Margarita Garcia DO  Family Medicine  01/18/2023

## 2023-01-18 NOTE — PROGRESS NOTES
Jan Thurman is a 74 y.o. female (: 1955) presenting to address:    Chief Complaint   Patient presents with    Medication Evaluation    Annual Wellness Visit     [address]        Vitals:    23 0735 23 0823   BP: (!) 144/85 (!) 172/80   Pulse: 61    Resp: 18    Temp: 97.6 °F (36.4 °C)    TempSrc: Temporal    SpO2: 100%    Weight: 177 lb (80.3 kg)    Height: 5' 5\" (1.651 m)    PainSc:   0 - No pain        Hearing/Vision:     Vision Screening    Right eye Left eye Both eyes   Without correction      With correction 20/25 -1 20/40 -1 20/20       Learning Assessment:     Learning Assessment 2023   PRIMARY LEARNER Patient   HIGHEST LEVEL OF EDUCATION - PRIMARY LEARNER  2 YEARS OF COLLEGE   BARRIERS PRIMARY LEARNER NONE   CO-LEARNER CAREGIVER No   PRIMARY LANGUAGE ENGLISH   LEARNER PREFERENCE PRIMARY VIDEOS     LISTENING   ANSWERED BY Patient   RELATIONSHIP SELF     Depression Screening:     3 most recent PHQ Screens 2023   Little interest or pleasure in doing things Not at all   Feeling down, depressed, irritable, or hopeless Not at all   Total Score PHQ 2 0     Fall Risk Assessment:     Fall Risk Assessment, last 12 mths 2023   Able to walk? Yes   Fall in past 12 months? 0   Do you feel unsteady? 0   Are you worried about falling 0     Abuse Screening:     Abuse Screening Questionnaire 2023   Do you ever feel afraid of your partner? N   Are you in a relationship with someone who physically or mentally threatens you? N   Is it safe for you to go home?  Y     ADL Assessment:     ADL Assessment 2023   Feeding yourself No Help Needed   Getting from bed to chair No Help Needed   Getting dressed No Help Needed   Bathing or showering No Help Needed   Walk across the room (includes cane/walker) No Help Needed   Using the telphone No Help Needed   Taking your medications No Help Needed   Preparing meals No Help Needed   Managing money (expenses/bills) No Help Needed   Moderately strenuous housework (laundry) No Help Needed   Shopping for personal items (toiletries/medicines) No Help Needed   Shopping for groceries No Help Needed   Driving No Help Needed   Climbing a flight of stairs No Help Needed   Getting to places beyond walking distances No Help Needed        Coordination of Care Questionaire:   1. \"Have you been to the ER, urgent care clinic since your last visit? Hospitalized since your last visit? \" No    2. \"Have you seen or consulted any other health care providers outside of the [address] [place] [address] [facility] since your last visit? \" Yes Oncologist x 12/2022 for follow up (Mastectomy on September 2022 per patient). 3. For patients aged 39-70: Has the patient had a colonoscopy / FIT/ Cologuard? No, pt states she is scheduled on 02/06/2023. If the patient is female:    4. For patients aged 41-77: Has the patient had a mammogram within the past 2 years? Yes - no Care Gap present  See top three    5. For patients aged 21-65: Has the patient had a pap smear? Yes - no Care Gap present    Advanced Directive:   1. Do you have an Advanced Directive? NO    2. Would you like information on Advanced Directives?  NO

## 2023-01-19 LAB
HCV AB SER IA-ACNC: <0.02 INDEX
HCV AB SERPL QL IA: NEGATIVE
HCV COMMENT,HCGAC: NORMAL

## 2023-01-26 RX ORDER — LANCETS 33 GAUGE
EACH MISCELLANEOUS
Qty: 100 EACH | Refills: 5 | Status: SHIPPED | OUTPATIENT
Start: 2023-01-26

## 2023-01-26 NOTE — TELEPHONE ENCOUNTER
Pt called stating that she needs a refill on her One Touch Delica Plus Lancet. Unable to attach not in med history. Please advise

## 2023-01-30 ENCOUNTER — TELEPHONE (OUTPATIENT)
Dept: FAMILY MEDICINE CLINIC | Age: 68
End: 2023-01-30

## 2023-01-30 NOTE — TELEPHONE ENCOUNTER
Pt called with questions in regards to medication. Pt stated that she has a colonoscopy appt coming up and she was told to stop taking her aspirin medication. Pt wanted to check with PCP to see if this was ok. I spoke with Aminata Malik and she stated that if she was instructed to stop then she needs to stop taking medication. I relayed information to pt and she asked what would happen to her if she stopped the medication for 7 days. I was unable to get a hold of Aminata Malik so I transferred the call to Nurse Kehinde Corrales

## 2023-01-30 NOTE — TELEPHONE ENCOUNTER
Pt called with questions in regards to medication. Pt stated that she has a colonoscopy appt coming up and she was told to stop taking her aspirin medication. Pt wanted to check with PCP to see if this was ok. I spoke with SCOTT Connelly and she stated that if she was instructed to stop then she needs to stop taking medication. I relayed information to pt and she asked what would happen to her if she stopped the medication for 7 days. I was unable to get a hold of SCOTT Connelly so I transferred the call to Nurse Unger.

## 2023-01-30 NOTE — TELEPHONE ENCOUNTER
Confirmed w/PCP that it is safe to stop aspirin 81 mg one week before scheduled colonoscopy on 2/6/23; PCP also stated if pt moreau not want to start back taking the aspirin after the procedue, that too, is safe and it would be discussed if pt still needs the aspirin at next visit w/PCP; pt voiced understanding.

## 2023-01-31 DIAGNOSIS — R79.89 ELEVATED SERUM FREE T4 LEVEL: Primary | ICD-10-CM

## 2023-01-31 DIAGNOSIS — E03.9 ACQUIRED HYPOTHYROIDISM: ICD-10-CM

## 2023-02-01 ENCOUNTER — TELEPHONE (OUTPATIENT)
Dept: FAMILY MEDICINE CLINIC | Age: 68
End: 2023-02-01

## 2023-02-01 NOTE — TELEPHONE ENCOUNTER
Pt called stating that the Trulicity was expensive and she would like to know the next step. Pt stated that her numbers have been high in the morning. Pt is also requesting test strips for Delica. Pt stated that she is also getting a colonoscopy done and she was going to take her metformin but she was not sure as to what to do. Informed pt that a message will be sent to PCP and she will receive a call back.  Please advise

## 2023-02-01 NOTE — TELEPHONE ENCOUNTER
Pt states that medication costing more than expected and would like to know what would be the next step as Ozempic still in back order. Pt also requesting for Test Strips for [facility] Glucometer. Please advise, thank you.

## 2023-02-01 NOTE — TELEPHONE ENCOUNTER
I sent alternative but let Patient know it could also be that with most insurances there is a deductible and she may not have met that yet. If Medicare part D then the deductible is ~500.

## 2023-02-06 NOTE — PROGRESS NOTES
This is a \"Welcome to [employer]"  Initial Preventive Physical Examination (IPPE) providing Personalized Prevention Plan Services (Performed in the first 12 months of enrollment)    I have reviewed the patient's medical history in detail and updated the computerized patient record. Assessment/Plan   Education and counseling provided:  Are appropriate based on today's review and evaluation  Pneumococcal Vaccine  Screening Pap and pelvic (covered once every 2 years)  Colorectal cancer screening tests  Cardiovascular screening blood test  Bone mass measurement (DEXA)  Diabetes screening test  Medical nutrition therapy for individuals with diabetes or renal disease    1. Medicare annual wellness visit, initial     Depression Risk Factor Screening     3 most recent PHQ Screens 1/18/2023   Little interest or pleasure in doing things Not at all   Feeling down, depressed, irritable, or hopeless Not at all   Total Score PHQ 2 0       Alcohol & Drug Abuse Risk Screen    Do you average more than 1 drink per night or more than 7 drinks a week: No    In the past three months have you have had more than 4 drinks containing alcohol on one occasion: No        Functional Ability and Level of Safety    Hearing: Hearing is good.      Vision Screening    Right eye Left eye Both eyes   Without correction      With correction 20/25 -1 20/40 -1 20/20        Activities of Daily Living:  ADL Assessment 1/18/2023   Feeding yourself No Help Needed   Getting from bed to chair No Help Needed   Getting dressed No Help Needed   Bathing or showering No Help Needed   Walk across the room (includes cane/walker) No Help Needed   Using the telphone No Help Needed   Taking your medications No Help Needed   Preparing meals No Help Needed   Managing money (expenses/bills) No Help Needed   Moderately strenuous housework (laundry) No Help Needed   Shopping for personal items (toiletries/medicines) No Help Needed   Shopping for groceries No Help Needed Driving No Help Needed   Climbing a flight of stairs No Help Needed   Getting to places beyond walking distances No Help Needed        Ambulation: with no difficulty     Fall Risk:  Fall Risk Assessment, last 12 mths 1/18/2023   Able to walk? Yes   Fall in past 12 months? 0   Do you feel unsteady? 0   Are you worried about falling 0        Abuse Screen:  Abuse Screening Questionnaire 1/18/2023   Do you ever feel afraid of your partner? N   Are you in a relationship with someone who physically or mentally threatens you? N   Is it safe for you to go home? Y       Cognitive Screening    Has your family/caregiver stated any concerns about your memory: no    Health Maintenance Due     Health Maintenance Due   Topic Date Due    Foot Exam Q1  Never done    Eye Exam Retinal or Dilated  Never done    Cervical cancer screen  Never done    Colorectal Cancer Screening Combo  Never done    Shingles Vaccine (1 of 2) Never done    COVID-19 Vaccine (3 - Booster for Pfizer series) 09/10/2021    Diabetic Alb to Cr ratio (uACR) test  12/19/2021    Bone Densitometry (Dexa) Screening  Never done    Pneumococcal 65+ years (3 - PPSV23 if available, else PCV20) 09/28/2022    Medicare Yearly Exam  Never done        Patient Care Team   Patient Care Team:  Halie Mcbride DO as PCP - General (Family Medicine)  Halie Mcbride DO as PCP - [facility] Empaneled Provider    History   There are no active hospital problems to display for this patient. Past Medical History:   Diagnosis Date    Hypertension     Kidney disease     Kidney stone      Past Surgical History:   Procedure Laterality Date    ND UNLISTED PROCEDURE BREAST       Current Outpatient Medications   Medication Sig    letrozole (FEMARA) 2.5 mg tablet Take 2.5 mg by mouth daily. cholecalciferol, vitamin D3, 50 mcg (2,000 unit) tab Take 2,000 Units by mouth daily.     magnesium oxide 400 mg magnesium cap Take  by mouth.    levothyroxine (SYNTHROID) 100 mcg tablet Take 1 Tablet by mouth Daily (before breakfast). Indications: a condition with low thyroid hormone levels    losartan-hydroCHLOROthiazide (HYZAAR) 100-25 mg per tablet Take 1 Tablet by mouth daily. Indications: high blood pressure, kidney protection    metFORMIN (GLUCOPHAGE) 1,000 mg tablet Take 1 Tablet by mouth two (2) times daily (with meals). Indications: type 2 diabetes mellitus    calcium carbonate (TUMS) 200 mg calcium (500 mg) chew Take 500 mg by mouth. acyclovir (ZOVIRAX) 400 mg tablet Take 400 mg by mouth daily. atenoloL (TENORMIN) 50 mg tablet atenolol 50 mg tablet   Take 1 tablet every day by oral route. aspirin 81 mg chewable tablet Take 81 mg by mouth daily. ibuprofen (MOTRIN) 800 mg tablet ibuprofen 800 mg tablet   TAKE 1 TABLET BY MOUTH THREE TIMES DAILY AS NEEDED    pioglitazone (ACTOS) 15 mg tablet pioglitazone 15 mg tablet   TAKE 1 TABLET BY MOUTH EVERY DAY AT BEDTIME    potassium chloride SR (KLOR-CON 10) 10 mEq tablet potassium chloride ER 10 mEq tablet,extended release   Take 1 tablet every day by oral route. rosuvastatin (CRESTOR) 5 mg tablet     dulaglutide (TRULICITY) 6.39 DR/7.1 mL sub-q pen Starter dose: Inject 0.75 mg subcutaneously once weekly; Will increase to 1.5 mg once weekly after 4 to 8 weeks . Indications: type 2 diabetes mellitus    dulaglutide (TRULICITY) 1.5 QG/2.8 mL sub-q pen 0.5 mL by SubCUTAneous route every seven (7) days. After finishing starter dose can use this. Indications: type 2 diabetes mellitus    lancets (One Touch Delica) 33 gauge misc Use to prick finger to check glucose levels up to four times daily. No current facility-administered medications for this visit.      Allergies   Allergen Reactions    Statins-Hmg-Coa Reductase Inhibitors Other (comments) and Palpitations    Colesevelam Other (comments)    Corticosteroids (Glucocorticoids) Other (comments)    Ezetimibe Other (comments)    Moxifloxacin Other (comments)    Naproxen Other (comments)    Nsaids (Non-Steroidal Anti-Inflammatory Drug) Other (comments)    [id number] Hives    Phenylephrine-Guaifenesin Itching    Sitagliptin Other (comments)      History reviewed. No pertinent family history.   Social History     Socioeconomic History    Marital status:    Tobacco Use    Smoking status: Former    Smokeless tobacco: Former       [address] [facility]  Family Medicine  01/18/2023

## 2023-02-09 NOTE — TELEPHONE ENCOUNTER
Pt returning call from yesterday. States she still has not received medication. States that medicare wants her to pay $500 up front and $44 every month. Please advise.

## 2023-02-10 DIAGNOSIS — E11.9 DIABETES MELLITUS TYPE 2, NONINSULIN DEPENDENT (HCC): Primary | ICD-10-CM

## 2023-02-10 RX ORDER — BLOOD SUGAR DIAGNOSTIC
STRIP MISCELLANEOUS
Qty: 100 STRIP | Refills: 11 | Status: SHIPPED | OUTPATIENT
Start: 2023-02-10

## 2023-02-10 RX ORDER — PIOGLITAZONEHYDROCHLORIDE 45 MG/1
45 TABLET ORAL DAILY
Qty: 90 TABLET | Refills: 0 | Status: SHIPPED | OUTPATIENT
Start: 2023-02-10

## 2023-02-10 NOTE — TELEPHONE ENCOUNTER
This means the Patient likely has a $500 deductible so I will have to send a lower cost treatment until her deductible has been met then we can try the newer treatments again. Lizabeth Abad will also have the same issue if the $500 is her deductible. When she is fasting she should skip her Metformin.  I will send strips and alternative medication (low cost for now.)

## 2023-02-16 ENCOUNTER — TELEPHONE (OUTPATIENT)
Dept: FAMILY MEDICINE CLINIC | Facility: CLINIC | Age: 68
End: 2023-02-16

## 2023-02-16 NOTE — TELEPHONE ENCOUNTER
Pt states that Pioglitazone is making her heart race and unable to sleep. She also states that her fasting blood sugar is at 180. Pt also states that her Losartan and Levothyroxine (88mcg) is giving her issues such as headache and heart burn. Pt would like to know if she can get an alternative. Pt states that she will continue taking Actos at this time. Pt requesting to be seen sooner but there's no availability sooner than her scheduled appt. Please advise, thank you.     Future Appointments   Date Time Provider Laureano Neal   3/9/2023  1:00 PM DO RAFAEL Wilson BS AMB

## 2023-02-16 NOTE — TELEPHONE ENCOUNTER
Patient called stating that the change in her medications has been causing her some issues.  She states that her heart races and has a headache when she takes medication please follow up

## 2023-02-17 NOTE — TELEPHONE ENCOUNTER
Patient needs med assistance and booked for soonest available appointment. It is not clear from the message why she thinks Pioglitazone is making her heart race at night, this is not a known side effect of treatment. First option is to try taking a different time of day or lower dose. If fasting glucose is in reasonable range, OK to continue with Actose alone at present for diabetes. Also Losartan and Levothyroxine don't have HA or GERD as common effect. Can you please triage situation?

## 2023-02-20 NOTE — TELEPHONE ENCOUNTER
Pt is scheduled for sooner.      Future Appointments   Date Time Provider Laureano Gilberti   2/21/2023  9:15 AM DO RAFAEL Wilson   3/9/2023  1:00 PM DO RAFAEL Wilson AMB

## 2023-02-20 NOTE — PROGRESS NOTES
Dia Kinsey (: 1955) is a 74 y.o. female, ESTABLISHED patient, here for FOLLOW UP:      ICD-10-CM    1. Type 2 diabetes mellitus without complication, without long-term current use of insulin (HCC)  E11.9 Microalbumin / Creatinine Urine Ratio     pioglitazone (ACTOS) 30 MG tablet      2. Hypothyroidism, unspecified type  E03.9 TSH + Free T4 Panel      3. Elevated serum free T4 level  R79.89 TSH + Free T4 Panel      4. HTN, goal below 140/90  I10 losartan-hydroCHLOROthiazide (HYZAAR) 100-12.5 MG per tablet      5. Calculus of kidney  N20.0       6. Dyslipidemia, goal LDL below 70  E78.5         Assessment   Plan     Established with Specialist for #L Breast CA dx  - s/p L mastectomy; No chemo/radiation  Follows q3mo and on Letrozole      #HTN - Goal BP <140/90  #Kidney stones - Reviewed dx and recommended dietary changes to reduce recurrence. Previously seen by Cardiology   BP machine at home? Yes; Reports average 130s   Rec review Echo 2017 EF 70%, mild aortic sclerosis no stenosis, abnormal diastolic filling   Reports some HA with change in HCTZ dose from 12.5 to 50mg, plan to return to to prior dosing, monitor at home, and reevaluate if Amlodipine needs to be readded. BP Readings from Last 3 Encounters:   23 (!) 158/84   23 (!) 172/80   23 133/77     #HLD - Goal LDL < 70  Tried multiple statins that caused leg cramping and had to stop; Interested in ruling out statin contributing to myalgias, plan to reduce dose to qOther day, monitor, and reintroduce)   Future considerations: Add on zetia  Lab Results   Component Value Date    LDLCALC 83 2023     #NIDDM2 - Goal A1C <7  Glucometer? Has at home  Hypoglycemia? Denies  Unable to afford Ozempic 2/2 deductible not met. C/o AE with 45mg pioglitazone, plan to reduce dose to 30mg.       Hemoglobin A1C   Date Value Ref Range Status   2023 6.7 (H) 4.2 - 5.6 % Final     Comment:     (NOTE)  HbA1C Interpretive Ranges  <5.7              Normal  5.7 - 6.4         Consider Prediabetes  >6.5              Consider Diabetes       #Hypothyroidism - T4 elevated, dose reduced to 88mcg, will reeval today    Lab Results   Component Value Date    TSH 0.52 01/18/2023    T4FREE 1.6 (H) 01/18/2023       Colon CA screening - Completed 2022, repeat 7 years (will consider continuing screening with less invasive alternative of Cologuard)         Orders Placed This Encounter   Procedures    Microalbumin / Creatinine Urine Ratio     Standing Status:   Future     Standing Expiration Date:   2/20/2024    TSH + Free T4 Panel     Standing Status:   Future     Standing Expiration Date:   2/21/2024     Return in about 4 months (around 6/21/2023) for 30min, BP, A1C. Subjective   See A/P     Current Outpatient Medications   Medication Instructions    acyclovir (ZOVIRAX) 400 mg, Oral, AS NEEDED    aspirin 81 mg, Oral, DAILY    atenolol (TENORMIN) 50 mg, Oral, DAILY    letrozole (FEMARA) 2.5 mg, Oral, DAILY    levothyroxine (SYNTHROID) 88 mcg, Oral, DAILY    losartan-hydroCHLOROthiazide (HYZAAR) 100-12.5 MG per tablet 1 tablet, Oral, DAILY    Magnesium Oxide 400 MG CAPS Oral    metFORMIN (GLUCOPHAGE) 1000 MG tablet metformin 1,000 mg tablet   TAKE 1 TABLET BY MOUTH TWICE DAILY    pioglitazone (ACTOS) 30 mg, Oral, DAILY    potassium chloride (KLOR-CON) 10 MEQ extended release tablet 10 mEq, Oral, DAILY    rosuvastatin (CRESTOR) 5 MG tablet TAKE 1 TABLET BY MOUTH EVERY DAY      Objective   BP (!) 158/84 (Site: Right Upper Arm, Position: Sitting, Cuff Size: Medium Adult)   Pulse 68   Temp 97.3 °F (36.3 °C) (Temporal)   Resp 16   Ht 5' 5\" (1.651 m)   Wt 175 lb (79.4 kg)   SpO2 98%   BMI 29.12 kg/m²   Physical Exam  Vitals and nursing note reviewed. Constitutional:       General: She is not in acute distress. Cardiovascular:      Rate and Rhythm: Normal rate. Pulmonary:      Effort: Pulmonary effort is normal. No respiratory distress. Neurological:      Mental Status: She is alert and oriented to person, place, and time.       Gait: Gait normal.   Psychiatric:         Mood and Affect: Mood normal.         Behavior: Behavior normal.          Abiel Arriaga,   Family Medicine  02/21/2023

## 2023-02-21 ENCOUNTER — HOSPITAL ENCOUNTER (OUTPATIENT)
Facility: HOSPITAL | Age: 68
Setting detail: SPECIMEN
Discharge: HOME | End: 2023-02-24
Payer: MEDICARE

## 2023-02-21 ENCOUNTER — OFFICE VISIT (OUTPATIENT)
Dept: FAMILY MEDICINE CLINIC | Facility: CLINIC | Age: 68
End: 2023-02-21
Payer: MEDICARE

## 2023-02-21 VITALS
TEMPERATURE: 97.3 F | HEART RATE: 68 BPM | HEIGHT: 65 IN | DIASTOLIC BLOOD PRESSURE: 84 MMHG | RESPIRATION RATE: 16 BRPM | WEIGHT: 175 LBS | OXYGEN SATURATION: 98 % | SYSTOLIC BLOOD PRESSURE: 158 MMHG | BODY MASS INDEX: 29.16 KG/M2

## 2023-02-21 DIAGNOSIS — E78.5 DYSLIPIDEMIA, GOAL LDL BELOW 70: ICD-10-CM

## 2023-02-21 DIAGNOSIS — N20.0 CALCULUS OF KIDNEY: ICD-10-CM

## 2023-02-21 DIAGNOSIS — R79.89 ELEVATED SERUM FREE T4 LEVEL: ICD-10-CM

## 2023-02-21 DIAGNOSIS — I10 HTN, GOAL BELOW 140/90: ICD-10-CM

## 2023-02-21 DIAGNOSIS — E11.9 TYPE 2 DIABETES MELLITUS WITHOUT COMPLICATION, WITHOUT LONG-TERM CURRENT USE OF INSULIN (HCC): ICD-10-CM

## 2023-02-21 DIAGNOSIS — E03.9 HYPOTHYROIDISM, UNSPECIFIED TYPE: ICD-10-CM

## 2023-02-21 DIAGNOSIS — E11.9 TYPE 2 DIABETES MELLITUS WITHOUT COMPLICATION, WITHOUT LONG-TERM CURRENT USE OF INSULIN (HCC): Primary | ICD-10-CM

## 2023-02-21 LAB
CREAT UR-MCNC: 88 MG/DL (ref 30–125)
MICROALBUMIN UR-MCNC: 0.93 MG/DL (ref 0–3)
MICROALBUMIN/CREAT UR-RTO: 11 MG/G (ref 0–30)
T4 FREE SERPL-MCNC: 1.4 NG/DL (ref 0.7–1.5)
TSH SERPL DL<=0.05 MIU/L-ACNC: 0.74 UIU/ML (ref 0.36–3.74)

## 2023-02-21 PROCEDURE — 1123F ACP DISCUSS/DSCN MKR DOCD: CPT | Performed by: STUDENT IN AN ORGANIZED HEALTH CARE EDUCATION/TRAINING PROGRAM

## 2023-02-21 PROCEDURE — 3077F SYST BP >= 140 MM HG: CPT | Performed by: STUDENT IN AN ORGANIZED HEALTH CARE EDUCATION/TRAINING PROGRAM

## 2023-02-21 PROCEDURE — 2022F DILAT RTA XM EVC RTNOPTHY: CPT | Performed by: STUDENT IN AN ORGANIZED HEALTH CARE EDUCATION/TRAINING PROGRAM

## 2023-02-21 PROCEDURE — 36415 COLL VENOUS BLD VENIPUNCTURE: CPT

## 2023-02-21 PROCEDURE — 1090F PRES/ABSN URINE INCON ASSESS: CPT | Performed by: STUDENT IN AN ORGANIZED HEALTH CARE EDUCATION/TRAINING PROGRAM

## 2023-02-21 PROCEDURE — G8400 PT W/DXA NO RESULTS DOC: HCPCS | Performed by: STUDENT IN AN ORGANIZED HEALTH CARE EDUCATION/TRAINING PROGRAM

## 2023-02-21 PROCEDURE — G8484 FLU IMMUNIZE NO ADMIN: HCPCS | Performed by: STUDENT IN AN ORGANIZED HEALTH CARE EDUCATION/TRAINING PROGRAM

## 2023-02-21 PROCEDURE — 3079F DIAST BP 80-89 MM HG: CPT | Performed by: STUDENT IN AN ORGANIZED HEALTH CARE EDUCATION/TRAINING PROGRAM

## 2023-02-21 PROCEDURE — 99214 OFFICE O/P EST MOD 30 MIN: CPT | Performed by: STUDENT IN AN ORGANIZED HEALTH CARE EDUCATION/TRAINING PROGRAM

## 2023-02-21 PROCEDURE — 84443 ASSAY THYROID STIM HORMONE: CPT

## 2023-02-21 PROCEDURE — G8417 CALC BMI ABV UP PARAM F/U: HCPCS | Performed by: STUDENT IN AN ORGANIZED HEALTH CARE EDUCATION/TRAINING PROGRAM

## 2023-02-21 PROCEDURE — 3044F HG A1C LEVEL LT 7.0%: CPT | Performed by: STUDENT IN AN ORGANIZED HEALTH CARE EDUCATION/TRAINING PROGRAM

## 2023-02-21 PROCEDURE — 3017F COLORECTAL CA SCREEN DOC REV: CPT | Performed by: STUDENT IN AN ORGANIZED HEALTH CARE EDUCATION/TRAINING PROGRAM

## 2023-02-21 PROCEDURE — G8427 DOCREV CUR MEDS BY ELIG CLIN: HCPCS | Performed by: STUDENT IN AN ORGANIZED HEALTH CARE EDUCATION/TRAINING PROGRAM

## 2023-02-21 PROCEDURE — 1036F TOBACCO NON-USER: CPT | Performed by: STUDENT IN AN ORGANIZED HEALTH CARE EDUCATION/TRAINING PROGRAM

## 2023-02-21 PROCEDURE — 82570 ASSAY OF URINE CREATININE: CPT

## 2023-02-21 RX ORDER — LETROZOLE 2.5 MG/1
2.5 TABLET, FILM COATED ORAL DAILY
COMMUNITY
Start: 2022-11-18

## 2023-02-21 RX ORDER — LEVOTHYROXINE SODIUM 88 UG/1
88 TABLET ORAL DAILY
COMMUNITY
Start: 2022-02-09

## 2023-02-21 RX ORDER — ROSUVASTATIN CALCIUM 5 MG/1
TABLET, COATED ORAL
COMMUNITY
Start: 2023-01-03

## 2023-02-21 RX ORDER — LOSARTAN POTASSIUM AND HYDROCHLOROTHIAZIDE 25; 100 MG/1; MG/1
1 TABLET ORAL DAILY
COMMUNITY
Start: 2023-01-09 | End: 2023-02-21 | Stop reason: DRUGHIGH

## 2023-02-21 RX ORDER — ATENOLOL 50 MG/1
50 TABLET ORAL DAILY
COMMUNITY
Start: 2022-03-06

## 2023-02-21 RX ORDER — POTASSIUM CHLORIDE 750 MG/1
10 TABLET, FILM COATED, EXTENDED RELEASE ORAL DAILY
COMMUNITY

## 2023-02-21 RX ORDER — ACYCLOVIR 400 MG/1
400 TABLET ORAL AS NEEDED
COMMUNITY
Start: 2022-12-21

## 2023-02-21 RX ORDER — CALCIUM CARBONATE/VITAMIN D3 500-10/5ML
LIQUID (ML) ORAL
COMMUNITY

## 2023-02-21 RX ORDER — LOSARTAN POTASSIUM AND HYDROCHLOROTHIAZIDE 12.5; 1 MG/1; MG/1
1 TABLET ORAL DAILY
Qty: 90 TABLET | Refills: 2 | Status: SHIPPED | OUTPATIENT
Start: 2023-02-21

## 2023-02-21 RX ORDER — PIOGLITAZONEHYDROCHLORIDE 30 MG/1
30 TABLET ORAL DAILY
Qty: 90 TABLET | Refills: 1 | Status: SHIPPED | OUTPATIENT
Start: 2023-02-21

## 2023-02-21 RX ORDER — ASPIRIN 81 MG/1
81 TABLET, CHEWABLE ORAL DAILY
COMMUNITY

## 2023-02-21 RX ORDER — PIOGLITAZONEHYDROCHLORIDE 15 MG/1
TABLET ORAL
COMMUNITY
Start: 2022-12-13 | End: 2023-02-21 | Stop reason: SDUPTHER

## 2023-02-21 ASSESSMENT — PATIENT HEALTH QUESTIONNAIRE - PHQ9
SUM OF ALL RESPONSES TO PHQ9 QUESTIONS 1 & 2: 0
1. LITTLE INTEREST OR PLEASURE IN DOING THINGS: 0
2. FEELING DOWN, DEPRESSED OR HOPELESS: 0
SUM OF ALL RESPONSES TO PHQ QUESTIONS 1-9: 0
SUM OF ALL RESPONSES TO PHQ QUESTIONS 1-9: 0
SUM OF ALL RESPONSES TO PHQ9 QUESTIONS 1 & 2: 0
SUM OF ALL RESPONSES TO PHQ QUESTIONS 1-9: 0
SUM OF ALL RESPONSES TO PHQ QUESTIONS 1-9: 0
2. FEELING DOWN, DEPRESSED OR HOPELESS: 0
SUM OF ALL RESPONSES TO PHQ QUESTIONS 1-9: 0
1. LITTLE INTEREST OR PLEASURE IN DOING THINGS: 0
SUM OF ALL RESPONSES TO PHQ QUESTIONS 1-9: 0

## 2023-02-21 ASSESSMENT — ANXIETY QUESTIONNAIRES
GAD7 TOTAL SCORE: 0
1. FEELING NERVOUS, ANXIOUS, OR ON EDGE: 0
7. FEELING AFRAID AS IF SOMETHING AWFUL MIGHT HAPPEN: 0
4. TROUBLE RELAXING: 0
6. BECOMING EASILY ANNOYED OR IRRITABLE: 0
3. WORRYING TOO MUCH ABOUT DIFFERENT THINGS: 0
5. BEING SO RESTLESS THAT IT IS HARD TO SIT STILL: 0
2. NOT BEING ABLE TO STOP OR CONTROL WORRYING: 0

## 2023-02-21 NOTE — PROGRESS NOTES
Miguelina Pantoja is a 74 y.o. female (: 1955) presenting to address:    Chief Complaint   Patient presents with    Follow-up    Medication Adjustment       Vitals:    23 0925   BP: (!) 158/84   Pulse: 68   Resp: 16   Temp: 97.3 °F (36.3 °C)   SpO2: 98%       Coordination of Care Questionaire:   1. \"Have you been to the ER, urgent care clinic since your last visit? Hospitalized since your last visit? \" No    2. \"Have you seen or consulted any other health care providers outside of the [address] [place] [address] since your last visit? \" No     3. For patients aged 39-70: Has the patient had a colonoscopy / FIT/ Cologuard? Yes - Care Gap present. Rooming MA/LPN to request most recent results. Pt reported getting colonoscopy done at [facility] in 2023. If the patient is female:    4. For patients aged 41-77: Has the patient had a mammogram within the past 2 years? yes      5. For patients aged 21-65: Has the patient had a pap smear? Yes - Care Gap present. Rooming MA/LPN to request most recent results. Pt reported getting it done as well at [facility] in . Advanced Directive:   1. Do you have an Advanced Directive? No    2. Would you like information on Advanced Directives?  No

## 2023-02-21 NOTE — LETTER
CONTROLLED SUBSTANCE MEDICATION AGREEMENT     Patient Name: Savannah Anderson  Patient YOB: 1955   I understand, that controlled substance medications may be used to help better manage my symptoms and to improve my ability to function at home, work and in social settings. However, I also understand that these medications do have risks, which have been discussed with me, including possible development of physical or psychological dependence. I understand that successful treatment requires mutual trust and honesty between me and my provider. I understand and agree that following this Medication Agreement is necessary in continuing my provider-patient relationship and the success of my treatment plan. Explanation from my Provider: Benefits and Goals of Controlled Substance Medications: There are two potential goals for your treatment: (1) decreased pain and suffering (2) improved daily life functions. There are many possible treatments for your chronic condition(s). Alternatives such as physical therapy, yoga, massage, home daily exercise, meditation, relaxation techniques, injections, chiropractic manipulations, surgery, cognitive therapy, hypnosis and many medications that are not habit-forming may be used. Use of controlled substance medications may be helpful, but they are unlikely to resolve all symptoms or restore all function. Explanation from my Provider: Risks of Controlled Substance Medications:  Opioid pain medications: These medications can lead to problems such as addiction/dependence, sedation, lightheadedness/dizziness, memory issues, falls, constipation, nausea, or vomiting. They may also impair the ability to drive or operate machinery. Additionally, these medications may lower testosterone levels, leading to loss of bone strength, stamina and sex drive.   They may cause problems with breathing, sleep apnea and reduced coughing, which is especially dangerous for patients with lung disease. Overdose or dangerous interactions with alcohol and other medications may occur, leading to death. Hyperalgesia may develop, which means patients receiving opioids for the treatment of pain may become more sensitive to certain painful stimuli, and in some cases, experience pain from ordinarily non-painful stimuli. Women between the ages of 14-53 who could become pregnant should carefully weigh the risks and benefits of opioids with their physicians, as these medications increase the risk of pregnancy complications, including miscarriage,  delivery and stillbirth. It is also possible for babies to be born addicted to opioids. Opioid dependence withdrawal symptoms may include; feelings of uneasiness, increased pain, irritability, belly pain, diarrhea, sweats and goose-flesh. Benzodiazepines and non-benzodiazepine sleep medications: These medications can lead to problems such as addiction/dependence, sedation, fatigue, lightheadedness, dizziness, incoordination, falls, depression, hallucinations, and impaired judgment, memory and concentration. The ability to drive and operate machinery may also be affected. Abnormal sleep-related behaviors have been reported, including sleepwalking, driving, making telephone calls, eating, or having sex while not fully awake. These medications can suppress breathing and worsen sleep apnea, particularly when combined with alcohol or other sedating medications, potentially leading to death. Dependence withdrawal symptoms may include tremors, anxiety, hallucinations and seizures. Stimulants:  Common adverse effects include addiction/dependence, increased blood  pressure and heart rate, decreased appetite, nausea, involuntary weight loss, insomnia,                                                                                                                     Initials:_______   irritability, and headaches.   These risks may increase when these medications are combined with other stimulants, such as caffeine pills or energy drinks, certain weight loss supplements and oral decongestants. Dependence withdrawal symptoms may include depressed mood, loss of interest, suicidal thoughts, anxiety, fatigue, appetite changes and agitation. Testosterone replacement therapy:  Potential side effects include increased risk of stroke and heart attack, blood clots, increased blood pressure, increased cholesterol, enlarged prostate, sleep apnea, irritability/aggression and other mood disorders, and decreased fertility. I agree and understand that I and my prescriber have the following rights and responsibilities regarding my treatment plan:     1. MY RIGHTS:  To be informed of my treatment and medication plan. To be an active participant in my health and wellbeing. 2. MY RESPONSIBILITY AND UNDERSTANDING FOR USE OF MEDICATIONS   I will take medications at the dose and frequency as directed. For my safety, I will not increase or change how I take my medications without the recommendation of my healthcare provider.  I will actively participate in any program recommended by my provider which may improve function, including social, physical, psychological programs.  I will not take my medications with alcohol or other drugs not prescribed to me. I understand that drinking alcohol with my medications increases the chances of side effects, including reduced breathing rate and could lead to personal injury when operating machinery.  I understand that if I have a history of substance use disorders, including alcohol or other illicit drugs, that I may be at increased risk of addiction to my medications.  I agree to notify my provider immediately if I should become pregnant so that my treatment plan can be adjusted.    I agree and understand that I shall only receive controlled substance medications from the prescriber that signed this agreement unless there is written agreement among other prescribers of controlled substances outlining the responsibility of the medications being prescribed.  I understand that the if the controlled medication is not helping to achieve goals, the dosage may be tapered and no longer prescribed. 3. MY RESPONSIBILITY FOR COMMUNICATION / PRESCRIPTION RENEWALS   I agree that all controlled substance medications that I take will be prescribed only by my provider. If another healthcare provider prescribes me medication in an emergency, I will notify my provider within seventy-two (72) hours.  I will arrange for refills at the prescribed interval ONLY during regular office hours. I will not ask for refills earlier than agreed, after-hours, on holidays or weekends. Refills may take up to 72 hours for processing and prescriptions to reach the pharmacy.  I will inform my other health care providers that I am taking these medications and of the existence of this [id number]. In the event of an emergency, I will provide the same information to the emergency department prescribers.  I will keep my provider updated on the pharmacy I am using for controlled medication prescription filling. Initials:_______  4. MY RESPONSIBILITY FOR PROTECTING MEDICATIONS   I will protect my prescriptions and medications. I understand that lost or misplaced prescriptions will not be replaced.  I will keep medications only for my own use and will not share them with others. I will keep all medications away from children.  I agree that if my medications are adjusted or discontinued, I will properly dispose of any remaining medications. I understand that I will be required to dispose of any remaining controlled medications as, directed by my prescriber, prior to being provided with any prescriptions for other controlled medications.   Medication drop box locations can be found at: [email or web address]    5. MY RESPONSIBILITY WITH ILLEGAL DRUGS    I will not use illegal or street drugs or another person's prescription medications not prescribed to me.  If there are identified addiction type symptoms, then referral to a program may be provided by my provider and I agree to follow through with this recommendation. 6. MY RESPONSIBILITY FOR COOPERATION WITH INVESTIGATIONS   I understand that my provider will comply with any applicable law and may discuss my use and/or possible misuse/abuse of controlled substances and alcohol, as appropriate, with any health care provider involved in my care, pharmacist, or legal authority.  I authorize my provider and pharmacy to cooperate fully with law enforcement agencies (as permitted by law) in the investigation of any possible misuse, sale, or other diversion of my controlled substances.  I agree to waive any applicable privilege or right of privacy or confidentiality with respect to these authorizations. 7. PROVIDERS RIGHT TO MONITOR FOR SAFETY: PRESCRIPTION MONITORING / DRUG TESTING   I consent to drug/toxicology screening and will submit to a drug screen upon my providers request to assure I am only taking the prescribed drugs for my safety monitoring. I understand that a drug screen is a laboratory test in which a sample of my urine, blood or saliva is checked to see what drugs I have been taking. This may entail an observed urine specimen, which means that a nurse or other health care provider may watch me provide urine, and I will cooperate if I am asked to provide an observed specimen.  I understand that my provider will check a copy of my State Prescription Monitoring Program () Report in order to safely prescribe medications.  Pill Counts: I consent to pill counts when requested.   I may be asked to bring all my prescribed controlled substance medications, in their original bottles, to all of my scheduled appointments. In addition, my provider may ask me to come to the practice at any time for a random pill count. 8. TERMINATION OF THIS AGREEMENT  For my safety, my prescriber has the right to stop prescribing controlled substance medications and may end this agreement. Initials:_______   Conditions that may result in termination of this agreement:  a. I do not show any improvement in pain, or my activity has not improved. b. I develop rapid tolerance or loss of improvement, as described in my treatment plan.  c. I develop significant side effects from the medication. d. My behavior is not consistent with the responsibilities outlined above, thereby causing safety concerns to continue prescribing controlled substance medications. e. I fail to follow the terms of this agreement. f. Other:____________________________       UNDERSTANDING THIS MEDICATION AGREEMENT:    I have read the above and have had all my questions answered. For chronic disease management, I know that my symptoms can be managed with many types of treatments. A chronic medication trial may be part of my treatment, but I must be an active participant in my care. Medication therapy is only one part of my symptom management plan. In some cases, there may be limited scientific evidence to support the chronic use of certain medications to improve symptoms and daily function. Furthermore, in certain circumstances, there may be scientific information that suggests that the use of chronic controlled substances may worsen my symptoms and increase my risk of unintentional death directly related to this medication therapy. I know that if my provider feels my risk from controlled medications is greater than my benefit, I will have my controlled substance medication(s) compassionately lowered or removed altogether.      I further agree to allow this office to contact my HIPAA contact if there are concerns about my safety and use of the controlled medications. I have agreed to use the prescribed controlled substance medications to me as instructed by my provider and as stated in this Medication Agreement. My initial on each page and my signature below shows that I have read each page and I have had the opportunity to ask questions with answers provided by my provider.     Patient Name (Printed): _____________________________________  Patient Signature:  ______________________   Date: _____________    Prescriber Name (Printed): ___________________________________  Prescriber Signature: _____________________  Date: _____________

## 2023-03-15 ENCOUNTER — TELEPHONE (OUTPATIENT)
Dept: FAMILY MEDICINE CLINIC | Facility: CLINIC | Age: 68
End: 2023-03-15

## 2023-04-03 ENCOUNTER — OFFICE VISIT (OUTPATIENT)
Dept: FAMILY MEDICINE CLINIC | Facility: CLINIC | Age: 68
End: 2023-04-03
Payer: MEDICARE

## 2023-04-03 VITALS
HEART RATE: 68 BPM | OXYGEN SATURATION: 99 % | BODY MASS INDEX: 28.82 KG/M2 | HEIGHT: 65 IN | WEIGHT: 173 LBS | RESPIRATION RATE: 17 BRPM | TEMPERATURE: 98.2 F | SYSTOLIC BLOOD PRESSURE: 160 MMHG | DIASTOLIC BLOOD PRESSURE: 84 MMHG

## 2023-04-03 DIAGNOSIS — I10 ESSENTIAL HYPERTENSION: Primary | ICD-10-CM

## 2023-04-03 PROCEDURE — 3017F COLORECTAL CA SCREEN DOC REV: CPT | Performed by: INTERNAL MEDICINE

## 2023-04-03 PROCEDURE — 1036F TOBACCO NON-USER: CPT | Performed by: INTERNAL MEDICINE

## 2023-04-03 PROCEDURE — 3077F SYST BP >= 140 MM HG: CPT | Performed by: INTERNAL MEDICINE

## 2023-04-03 PROCEDURE — 1123F ACP DISCUSS/DSCN MKR DOCD: CPT | Performed by: INTERNAL MEDICINE

## 2023-04-03 PROCEDURE — 3079F DIAST BP 80-89 MM HG: CPT | Performed by: INTERNAL MEDICINE

## 2023-04-03 PROCEDURE — G8417 CALC BMI ABV UP PARAM F/U: HCPCS | Performed by: INTERNAL MEDICINE

## 2023-04-03 PROCEDURE — 99214 OFFICE O/P EST MOD 30 MIN: CPT | Performed by: INTERNAL MEDICINE

## 2023-04-03 PROCEDURE — 1090F PRES/ABSN URINE INCON ASSESS: CPT | Performed by: INTERNAL MEDICINE

## 2023-04-03 PROCEDURE — G8400 PT W/DXA NO RESULTS DOC: HCPCS | Performed by: INTERNAL MEDICINE

## 2023-04-03 PROCEDURE — G8427 DOCREV CUR MEDS BY ELIG CLIN: HCPCS | Performed by: INTERNAL MEDICINE

## 2023-04-03 RX ORDER — AMLODIPINE BESYLATE 5 MG/1
5 TABLET ORAL DAILY
Qty: 90 TABLET | Refills: 0 | Status: SHIPPED | OUTPATIENT
Start: 2023-04-03

## 2023-04-03 RX ORDER — HYDROCHLOROTHIAZIDE 12.5 MG/1
12.5 TABLET ORAL DAILY
COMMUNITY

## 2023-04-03 RX ORDER — AZITHROMYCIN 250 MG/1
250 TABLET, FILM COATED ORAL DAILY
COMMUNITY
Start: 2023-03-29

## 2023-04-03 RX ORDER — LOSARTAN POTASSIUM 100 MG/1
100 TABLET ORAL DAILY
COMMUNITY

## 2023-04-03 SDOH — ECONOMIC STABILITY: HOUSING INSECURITY
IN THE LAST 12 MONTHS, WAS THERE A TIME WHEN YOU DID NOT HAVE A STEADY PLACE TO SLEEP OR SLEPT IN A SHELTER (INCLUDING NOW)?: NO

## 2023-04-03 SDOH — ECONOMIC STABILITY: INCOME INSECURITY: HOW HARD IS IT FOR YOU TO PAY FOR THE VERY BASICS LIKE FOOD, HOUSING, MEDICAL CARE, AND HEATING?: NOT HARD AT ALL

## 2023-04-03 SDOH — ECONOMIC STABILITY: FOOD INSECURITY: WITHIN THE PAST 12 MONTHS, YOU WORRIED THAT YOUR FOOD WOULD RUN OUT BEFORE YOU GOT MONEY TO BUY MORE.: NEVER TRUE

## 2023-04-03 SDOH — ECONOMIC STABILITY: FOOD INSECURITY: WITHIN THE PAST 12 MONTHS, THE FOOD YOU BOUGHT JUST DIDN'T LAST AND YOU DIDN'T HAVE MONEY TO GET MORE.: NEVER TRUE

## 2023-04-03 ASSESSMENT — PATIENT HEALTH QUESTIONNAIRE - PHQ9
SUM OF ALL RESPONSES TO PHQ QUESTIONS 1-9: 0
SUM OF ALL RESPONSES TO PHQ QUESTIONS 1-9: 0
2. FEELING DOWN, DEPRESSED OR HOPELESS: 0
SUM OF ALL RESPONSES TO PHQ QUESTIONS 1-9: 0
SUM OF ALL RESPONSES TO PHQ QUESTIONS 1-9: 0
1. LITTLE INTEREST OR PLEASURE IN DOING THINGS: 0
SUM OF ALL RESPONSES TO PHQ9 QUESTIONS 1 & 2: 0

## 2023-04-03 ASSESSMENT — ENCOUNTER SYMPTOMS
COUGH: 0
SHORTNESS OF BREATH: 0

## 2023-04-03 NOTE — PROGRESS NOTES
HISTORY OF PRESENT ILLNESS  Ayanna Montenegro is a 74 y.o. female    HPI  HTN, not controlled, her BP has been elevated, she is taking cozaar 100 mg daily, HCTZ 12.5 mg daily and atenolol 50 mg daily. Review of Systems   Respiratory:  Negative for cough and shortness of breath. Cardiovascular:  Negative for chest pain and leg swelling. Neurological:  Negative for dizziness and headaches. Physical Exam  Vitals reviewed. Cardiovascular:      Rate and Rhythm: Normal rate and regular rhythm. Heart sounds: No murmur heard. Pulmonary:      Effort: Pulmonary effort is normal.      Breath sounds: Normal breath sounds. No rales. Musculoskeletal:      Right lower leg: No edema. Left lower leg: No edema. Neurological:      Mental Status: She is oriented to person, place, and time. ASSESSMENT and PLAN    1. Essential hypertension, not controlled, continue cozaar/hctz/Atenolol @ current doses, will add norvasc:  -     amLODIPine (NORVASC) 5 MG tablet; Take 1 tablet by mouth daily, Disp-90 tablet, R-0Normal         Return in about 1 month (around 5/3/2023) for follow up with Dr Claudette Grice in a month. Zhou Jackson

## 2023-04-03 NOTE — PROGRESS NOTES
Zarina Wilkins is a 74 y.o. female (: 1955) presenting to address:    Chief Complaint   Patient presents with    Blood Pressure Check       Vitals:    23 1355   BP: (!) 208/97   Pulse: 68   Resp: 17   Temp: 98.2 °F (36.8 °C)   SpO2: 99%       Coordination of Care Questionaire:   1. \"Have you been to the ER, urgent care clinic since your last visit? Hospitalized since your last visit? \" Yes PF x  for headache. 2. \"Have you seen or consulted any other health care providers outside of the [address] [place] [address] since your last visit? \" No     3. For patients aged 39-70: Has the patient had a colonoscopy / FIT/ Cologuard? No      If the patient is female:    4. For patients aged 41-77: Has the patient had a mammogram within the past 2 years? NA - based on age or sex      11. For patients aged 21-65: Has the patient had a pap smear? No    Advanced Directive:   1. Do you have an Advanced Directive? No    2. Would you like information on Advanced Directives?  No

## 2023-05-01 NOTE — PROGRESS NOTES
Manny Curiel (: 1955) is a 74 y.o. female, ESTABLISHED patient, here for FOLLOW UP:    ICD-10-CM    1. Hospital discharge follow-up  Z09       2. Hearing difficulty of both ears  H91.93 External Referral To ENT     VT REMOVAL IMPACTED CERUMEN INSTRUMENTATION UNILAT      3. Hypertension goal BP (blood pressure) < 130/80  I10       4. Dyslipidemia, goal LDL below 70  E78.5       5. Type 2 diabetes mellitus without complication, without long-term current use of insulin (HCC)  E11.9       6. Calculus of kidney  N20.0         Assessment   Plan   #Hospital discharge follow up for chest pain  Rec review visit, testing, and treatment plan with Patient in office   No change in long term treatments noted. Rec review stress test 23  CONCLUSIONS     * Electrocardiogram (ECG) response is negative. * Transient ischemic dilatation absent. * Stress Single Photon Emission Computed Tomography (SPECT) tomographic   images reveal homogeneous tracer uptake throughout the myocardium. Rest images   show no significant change. * Post stress left ventricular ejection fraction is normal, 70 %. * Post stress images segmental wall thickening and motion are normal.     * Myocardial perfusion imaging is normal.     * This is a low risk scan. Established with Specialist for #L Breast CA dx  - s/p L mastectomy; No chemo/radiation  Follows q3mo and on Letrozole      #HTN - Goal BP <130/80  #Kidney stones - Reviewed dx and recommended dietary changes to reduce recurrence. Previously seen by Cardiology   BP machine at home? Yes; Reports average 130s   Rec review Echo 2017 EF 70%, mild aortic sclerosis no stenosis, abnormal diastolic filling   Reports some HA with change in HCTZ dose from 12.5 to 50mg, plan to return to to prior dosing, monitor at home, and reevaluate if Amlodipine needs to be readded.      BP Readings from Last 3 Encounters:   23 (!) 142/84   23 (!) 160/84   23 (!)

## 2023-05-03 ENCOUNTER — OFFICE VISIT (OUTPATIENT)
Dept: FAMILY MEDICINE CLINIC | Facility: CLINIC | Age: 68
End: 2023-05-03

## 2023-05-03 VITALS
RESPIRATION RATE: 16 BRPM | HEART RATE: 74 BPM | WEIGHT: 175 LBS | HEIGHT: 65 IN | SYSTOLIC BLOOD PRESSURE: 142 MMHG | BODY MASS INDEX: 29.16 KG/M2 | TEMPERATURE: 97.8 F | DIASTOLIC BLOOD PRESSURE: 84 MMHG | OXYGEN SATURATION: 99 %

## 2023-05-03 DIAGNOSIS — H91.93 HEARING DIFFICULTY OF BOTH EARS: ICD-10-CM

## 2023-05-03 DIAGNOSIS — I10 HYPERTENSION GOAL BP (BLOOD PRESSURE) < 130/80: ICD-10-CM

## 2023-05-03 DIAGNOSIS — Z09 HOSPITAL DISCHARGE FOLLOW-UP: Primary | ICD-10-CM

## 2023-05-03 DIAGNOSIS — N20.0 CALCULUS OF KIDNEY: ICD-10-CM

## 2023-05-03 DIAGNOSIS — E78.5 DYSLIPIDEMIA, GOAL LDL BELOW 70: ICD-10-CM

## 2023-05-03 DIAGNOSIS — E11.9 TYPE 2 DIABETES MELLITUS WITHOUT COMPLICATION, WITHOUT LONG-TERM CURRENT USE OF INSULIN (HCC): ICD-10-CM

## 2023-05-03 ASSESSMENT — PATIENT HEALTH QUESTIONNAIRE - PHQ9
SUM OF ALL RESPONSES TO PHQ QUESTIONS 1-9: 0
SUM OF ALL RESPONSES TO PHQ9 QUESTIONS 1 & 2: 0
SUM OF ALL RESPONSES TO PHQ QUESTIONS 1-9: 0
2. FEELING DOWN, DEPRESSED OR HOPELESS: 0
SUM OF ALL RESPONSES TO PHQ QUESTIONS 1-9: 0
SUM OF ALL RESPONSES TO PHQ QUESTIONS 1-9: 0
1. LITTLE INTEREST OR PLEASURE IN DOING THINGS: 0

## 2023-05-03 NOTE — PROGRESS NOTES
Cailin Morrison is a 74 y.o. female (: 1955) presenting to address:    Chief Complaint   Patient presents with    Follow-up    Blood Pressure Check       Vitals:    23 1402   BP: (!) 142/84   Pulse: 74   Resp: 16   Temp: 97.8 °F (36.6 °C)   SpO2: 99%       Coordination of Care Questionaire:   1. \"Have you been to the ER, urgent care clinic since your last visit? Hospitalized since your last visit? \" Yes 23, sentara, chest pain    2. \"Have you seen or consulted any other health care providers outside of the [address] [place] [address] since your last visit? \" No     3. For patients aged 39-70: Has the patient had a colonoscopy / FIT/ Cologuard? Yes - no Care Gap present      If the patient is female:    4. For patients aged 41-77: Has the patient had a mammogram within the past 2 years? Yes - no Care Gap present      5. For patients aged 21-65: Has the patient had a pap smear? NA - based on age or sex    Advanced Directive:   1. Do you have an Advanced Directive? No    2. Would you like information on Advanced Directives?  No

## 2023-05-03 NOTE — PATIENT INSTRUCTIONS
When antibiotic needed, ask for Z pack (Azithromycin) first if it covers infection. For MyChart concerns, you can call: [phone] or visit: [email or web address]     GERD vs Acid Reflux Overview     Gastroesophageal reflux, also called \"acid reflux,\" occurs when the stomach contents back up into the esophagus and/or mouth. Occasional reflux is normal and can happen in healthy infants, children, and adults, most often after eating a meal. Most episodes are brief and do not cause bothersome symptoms or complications. In contrast, people with gastroesophageal reflux disease (GERD) experience bothersome symptoms or damage to the esophagus as a result of acid reflux. The most common symptoms of GERD are:  ?Heartburn - This typically feels like a burning sensation in the center of the chest, which sometimes spreads to the throat. It most often happens after a meal.  ?Regurgitation - This is when stomach contents (acid mixed with bits of undigested food) flow back into your mouth or throat. Other symptoms of GERD may include:  ?Stomach pain (pain in the upper abdomen)  ? Chest pain   ? Difficulty swallowing (called dysphagia) or pain on swallowing (called odynophagia)  ? Persistent laryngitis/hoarseness (due to the acid irritating the vocal cords)  ? Persistent sore throat or cough  ? Sense of a lump in the throat  ? Chronic nocturnal cough  ? Nausea and/or vomiting    The following lifestyle changes are often recommended:  ?Raising the head of your bed six to eight inches - Although most people only have heartburn during the two- to three-hour period after meals, some wake up at night with heartburn. People with nighttime heartburn can elevate the head of their bed, which raises the head and shoulders higher than the stomach, allowing gravity to prevent acid from refluxing. You can raise the head of your bed by putting blocks of wood under the legs or a foam wedge under the mattress.  Several

## 2023-05-09 DIAGNOSIS — E11.9 TYPE 2 DIABETES MELLITUS WITHOUT COMPLICATION, WITHOUT LONG-TERM CURRENT USE OF INSULIN (HCC): ICD-10-CM

## 2023-05-11 RX ORDER — PIOGLITAZONEHYDROCHLORIDE 45 MG/1
TABLET ORAL
Qty: 90 TABLET | Refills: 1 | Status: SHIPPED | OUTPATIENT
Start: 2023-05-11 | End: 2023-05-21 | Stop reason: DRUGHIGH

## 2023-05-19 NOTE — TELEPHONE ENCOUNTER
Pt called stating that DR Donna Wisdom prescribed the 45 mg of the pioglitazone that the medication gave her heart palpitations. Pt went to get prescription and it was the 45 mg.  Pt needs 30 mg

## 2023-05-21 RX ORDER — PIOGLITAZONEHYDROCHLORIDE 30 MG/1
30 TABLET ORAL DAILY
Qty: 90 TABLET | Refills: 3 | Status: SHIPPED | OUTPATIENT
Start: 2023-05-21

## 2023-05-31 DIAGNOSIS — I10 ESSENTIAL HYPERTENSION: ICD-10-CM

## 2023-05-31 RX ORDER — AMLODIPINE BESYLATE 5 MG/1
5 TABLET ORAL DAILY
Qty: 90 TABLET | Refills: 1 | OUTPATIENT
Start: 2023-05-31

## 2023-07-12 DIAGNOSIS — I10 ESSENTIAL HYPERTENSION: ICD-10-CM

## 2023-07-12 DIAGNOSIS — E11.9 TYPE 2 DIABETES MELLITUS WITHOUT COMPLICATION, WITHOUT LONG-TERM CURRENT USE OF INSULIN (HCC): ICD-10-CM

## 2023-07-14 RX ORDER — POTASSIUM CHLORIDE 750 MG/1
10 TABLET, FILM COATED, EXTENDED RELEASE ORAL DAILY
Qty: 60 TABLET | Status: CANCELLED | OUTPATIENT
Start: 2023-07-14

## 2023-07-14 RX ORDER — ACYCLOVIR 400 MG/1
400 TABLET ORAL AS NEEDED
Status: CANCELLED | OUTPATIENT
Start: 2023-07-14

## 2023-07-14 RX ORDER — HYDROCHLOROTHIAZIDE 12.5 MG/1
12.5 TABLET ORAL DAILY
Qty: 90 TABLET | Refills: 3 | Status: SHIPPED | OUTPATIENT
Start: 2023-07-14

## 2023-07-14 RX ORDER — AMLODIPINE BESYLATE 5 MG/1
5 TABLET ORAL DAILY
Qty: 90 TABLET | Refills: 3 | Status: CANCELLED | OUTPATIENT
Start: 2023-07-14

## 2023-07-14 RX ORDER — ROSUVASTATIN CALCIUM 5 MG/1
5 TABLET, COATED ORAL DAILY
Qty: 90 TABLET | Refills: 3 | Status: SHIPPED | OUTPATIENT
Start: 2023-07-14

## 2023-07-14 RX ORDER — LEVOTHYROXINE SODIUM 88 UG/1
88 TABLET ORAL DAILY
Qty: 90 TABLET | Refills: 1 | Status: SHIPPED | OUTPATIENT
Start: 2023-07-14

## 2023-07-14 RX ORDER — ATENOLOL 50 MG/1
50 TABLET ORAL DAILY
Qty: 90 TABLET | Refills: 2 | Status: SHIPPED | OUTPATIENT
Start: 2023-07-14

## 2023-07-14 RX ORDER — LETROZOLE 2.5 MG/1
2.5 TABLET, FILM COATED ORAL DAILY
Qty: 30 TABLET | Status: CANCELLED | OUTPATIENT
Start: 2023-07-14

## 2023-07-14 RX ORDER — LOSARTAN POTASSIUM 100 MG/1
100 TABLET ORAL DAILY
Qty: 90 TABLET | Refills: 3 | Status: SHIPPED | OUTPATIENT
Start: 2023-07-14

## 2023-07-19 ENCOUNTER — TELEPHONE (OUTPATIENT)
Dept: FAMILY MEDICINE CLINIC | Facility: CLINIC | Age: 68
End: 2023-07-19

## 2023-07-19 NOTE — TELEPHONE ENCOUNTER
Pt called back stating that the two medications she stated were sent were actually prescribed by a different provider. Patient its wanting dr Israel Luong to fill them but is okay with waiting to see her in office. She is also requesting test strips be sent.   Future Appointments   Date Time Provider [address]   8/14/2023  1:30 PM DO RAFAEL Wilson BS AMB

## 2023-07-19 NOTE — TELEPHONE ENCOUNTER
Pt called sating that 2 of the medication were not sent over to the mail order. They are missing pioglitazone (ACTOS) 30 MG tablet and amLODIPine (NORVASC) 5 MG tablet the other medications has been sent.  Please advise

## 2023-08-02 DIAGNOSIS — I10 ESSENTIAL HYPERTENSION: ICD-10-CM

## 2023-08-02 NOTE — TELEPHONE ENCOUNTER
PT called with Rx Refill Request.  Requested Prescriptions     Pending Prescriptions Disp Refills    amLODIPine (NORVASC) 5 MG tablet 90 tablet 0     Sig: Take 1 tablet by mouth daily    acyclovir (ZOVIRAX) 400 MG tablet       Sig: Take 1 tablet by mouth as needed    potassium chloride (KLOR-CON) 10 MEQ extended release tablet 60 tablet      Sig: Take 1 tablet by mouth daily

## 2023-08-03 RX ORDER — ACYCLOVIR 400 MG/1
400 TABLET ORAL AS NEEDED
Qty: 90 TABLET | Refills: 3 | Status: SHIPPED | OUTPATIENT
Start: 2023-08-03

## 2023-08-03 RX ORDER — POTASSIUM CHLORIDE 750 MG/1
10 TABLET, FILM COATED, EXTENDED RELEASE ORAL DAILY
Qty: 90 TABLET | Refills: 1 | Status: SHIPPED | OUTPATIENT
Start: 2023-08-03

## 2023-08-03 RX ORDER — AMLODIPINE BESYLATE 5 MG/1
5 TABLET ORAL DAILY
Qty: 90 TABLET | Refills: 3 | Status: SHIPPED | OUTPATIENT
Start: 2023-08-03

## 2023-08-03 NOTE — TELEPHONE ENCOUNTER
Last seen: 05/03/23    Last filled: 02/21/23    Dispense Quantity: -- Refills: --       Future Appointments   Date Time Provider [address]   8/14/2023  1:30 PM DO RAFAEL Wilson BS AMB       Last seen: 05/03/23    Last filled: 12/21/22    Dispense Quantity: -- Refills: --     Future Appointments   Date Time Provider [facility] [address]   8/14/2023  1:30 PM DO RAFAEL Wilson BS AMB       Last seen: 05/03/23    Last filled: 04/03/23    Dispense Quantity: 90 tablet Refills: 0       Future Appointments   Date Time Provider [facility] [address]   8/14/2023  1:30 PM DO RAFAEL Wilson AMB

## 2023-08-14 ENCOUNTER — TELEPHONE (OUTPATIENT)
Dept: FAMILY MEDICINE CLINIC | Facility: CLINIC | Age: 68
End: 2023-08-14

## 2023-08-14 NOTE — TELEPHONE ENCOUNTER
Due to [employer] not accepting pt's insurance pt had to mike appt. However pt would like to know if she could have lab orders placed just to check her blood work.  Please advise

## 2023-08-29 DIAGNOSIS — E11.9 TYPE 2 DIABETES MELLITUS WITHOUT COMPLICATION, WITHOUT LONG-TERM CURRENT USE OF INSULIN (HCC): ICD-10-CM

## 2023-08-29 RX ORDER — PIOGLITAZONEHYDROCHLORIDE 30 MG/1
30 TABLET ORAL DAILY
Qty: 90 TABLET | Refills: 3 | OUTPATIENT
Start: 2023-08-29

## 2023-09-11 DIAGNOSIS — E11.9 TYPE 2 DIABETES MELLITUS WITHOUT COMPLICATION, WITHOUT LONG-TERM CURRENT USE OF INSULIN (HCC): ICD-10-CM

## 2023-09-11 NOTE — TELEPHONE ENCOUNTER
Pt stated that all of her prescriptions have been transferred to [facility] accept this specific medication.  Due to pt being out of network with [facility] pt is needing prescriptions sent to different pharmacy

## 2023-09-12 NOTE — TELEPHONE ENCOUNTER
[facility]? Or is she needing it sent to [facility] as well? Will approve once clarified.

## 2023-09-13 RX ORDER — PIOGLITAZONEHYDROCHLORIDE 30 MG/1
30 TABLET ORAL DAILY
Qty: 90 TABLET | Refills: 2 | Status: SHIPPED | OUTPATIENT
Start: 2023-09-13

## 2023-10-23 DIAGNOSIS — I10 ESSENTIAL HYPERTENSION: ICD-10-CM

## 2023-10-23 NOTE — TELEPHONE ENCOUNTER
Pt has enough she did not want to run out of medication. Pt stated that it's not urgent they just sent her last refill to her.

## 2023-10-24 RX ORDER — LOSARTAN POTASSIUM 100 MG/1
100 TABLET ORAL DAILY
Qty: 90 TABLET | Refills: 1 | Status: SHIPPED | OUTPATIENT
Start: 2023-10-24

## 2023-10-24 RX ORDER — ROSUVASTATIN CALCIUM 5 MG/1
5 TABLET, COATED ORAL DAILY
Qty: 90 TABLET | Refills: 1 | Status: SHIPPED | OUTPATIENT
Start: 2023-10-24

## 2023-11-09 ENCOUNTER — OFFICE VISIT (OUTPATIENT)
Dept: FAMILY MEDICINE CLINIC | Facility: CLINIC | Age: 68
End: 2023-11-09

## 2023-11-09 VITALS — TEMPERATURE: 97.3 F

## 2023-11-09 DIAGNOSIS — Z23 NEED FOR VACCINATION: Primary | ICD-10-CM

## 2023-11-09 NOTE — PROGRESS NOTES
Immunizations Administered       Name Date Dose Route    Influenza, FLUAD, (age 72 y+), Adjuvanted, 0.5mL 11/9/2023 0.5 mL Intramuscular    Site: Deltoid- Left    Lot: [id number]    [address]: [id number]

## 2024-01-12 NOTE — TELEPHONE ENCOUNTER
PT called with Rx Refill Request. Requests for 30 day refill to tide her over until [facility] can get her a full 90 days.  Requested Prescriptions     Pending Prescriptions Disp Refills    acyclovir (ZOVIRAX) 400 MG tablet 90 tablet 3     Sig: Take 1 tablet by mouth as needed (HSV flare)     Pt not on wait list. She is schedule with [facility], but hasn't established yet

## 2024-01-15 RX ORDER — ACYCLOVIR 400 MG/1
400 TABLET ORAL AS NEEDED
Qty: 90 TABLET | Refills: 3 | Status: SHIPPED | OUTPATIENT
Start: 2024-01-15

## 2024-10-18 LAB — MAMMOGRAPHY, EXTERNAL: NEGATIVE

## 2024-11-04 ENCOUNTER — OFFICE VISIT (OUTPATIENT)
Dept: FAMILY MEDICINE CLINIC | Facility: CLINIC | Age: 69
End: 2024-11-04

## 2024-11-04 VITALS
OXYGEN SATURATION: 98 % | HEART RATE: 67 BPM | TEMPERATURE: 97.8 F | RESPIRATION RATE: 16 BRPM | WEIGHT: 172 LBS | DIASTOLIC BLOOD PRESSURE: 71 MMHG | BODY MASS INDEX: 28.66 KG/M2 | SYSTOLIC BLOOD PRESSURE: 113 MMHG | HEIGHT: 65 IN

## 2024-11-04 DIAGNOSIS — Z76.89 ESTABLISHING CARE WITH NEW DOCTOR, ENCOUNTER FOR: Primary | ICD-10-CM

## 2024-11-04 DIAGNOSIS — M25.562 CHRONIC PAIN OF LEFT KNEE: ICD-10-CM

## 2024-11-04 DIAGNOSIS — E11.9 TYPE 2 DIABETES MELLITUS WITHOUT COMPLICATION, WITHOUT LONG-TERM CURRENT USE OF INSULIN (HCC): ICD-10-CM

## 2024-11-04 DIAGNOSIS — E78.2 MIXED HYPERLIPIDEMIA: ICD-10-CM

## 2024-11-04 DIAGNOSIS — E03.9 HYPOTHYROIDISM, UNSPECIFIED TYPE: ICD-10-CM

## 2024-11-04 DIAGNOSIS — I10 PRIMARY HYPERTENSION: ICD-10-CM

## 2024-11-04 DIAGNOSIS — G89.29 CHRONIC PAIN OF LEFT KNEE: ICD-10-CM

## 2024-11-04 SDOH — ECONOMIC STABILITY: FOOD INSECURITY: WITHIN THE PAST 12 MONTHS, THE FOOD YOU BOUGHT JUST DIDN'T LAST AND YOU DIDN'T HAVE MONEY TO GET MORE.: NEVER TRUE

## 2024-11-04 SDOH — ECONOMIC STABILITY: INCOME INSECURITY: HOW HARD IS IT FOR YOU TO PAY FOR THE VERY BASICS LIKE FOOD, HOUSING, MEDICAL CARE, AND HEATING?: NOT HARD AT ALL

## 2024-11-04 SDOH — ECONOMIC STABILITY: FOOD INSECURITY: WITHIN THE PAST 12 MONTHS, YOU WORRIED THAT YOUR FOOD WOULD RUN OUT BEFORE YOU GOT MONEY TO BUY MORE.: NEVER TRUE

## 2024-11-04 ASSESSMENT — PATIENT HEALTH QUESTIONNAIRE - PHQ9
SUM OF ALL RESPONSES TO PHQ QUESTIONS 1-9: 0
SUM OF ALL RESPONSES TO PHQ QUESTIONS 1-9: 0
1. LITTLE INTEREST OR PLEASURE IN DOING THINGS: NOT AT ALL
DEPRESSION UNABLE TO ASSESS: FUNCTIONAL CAPACITY MOTIVATION LIMITS ACCURACY
SUM OF ALL RESPONSES TO PHQ QUESTIONS 1-9: 0
SUM OF ALL RESPONSES TO PHQ9 QUESTIONS 1 & 2: 0
2. FEELING DOWN, DEPRESSED OR HOPELESS: NOT AT ALL
SUM OF ALL RESPONSES TO PHQ QUESTIONS 1-9: 0

## 2024-11-04 ASSESSMENT — ENCOUNTER SYMPTOMS
SHORTNESS OF BREATH: 0
SINUS PAIN: 0
CHEST TIGHTNESS: 0

## 2024-11-04 NOTE — PROGRESS NOTES
[facility]    HISTORY OF PRESENT ILLNESS  Ayesha Spencer  is a 69 y.o. y.o. female here to establish care.    HTN  -losartan 100, atenolol 50, amlodipine 5, HCTZ 12.5    T2DM  -pioglitazone 30, metformin 1000  -following endocrinology Dr. Sola RIOS  -crestor 5 every other day  -has tried many different statins and gets leg cramps  -following cardiologist Dr. Aldrich     Hypothyroidism   -synthroid 88    Hx of L breast cancer  -taking letrozole 2.5  -s/p mastectomy unilateral  -following [place] oncology    L knee pain  -worse after walking  -XR 10/24 at [facility] showed mild arthritis  -taking ibuprofen with some relief  -declines PT and injection at this time    Health Maintenance:    Cervical Cancer Screen/PAP: n/a  Breast Cancer Screen/Mammogram: 10/24 wnl  HCV Screen:   Lab Results   Component Value Date    HEPCAB <0.02 2023    HEPCAB Negative 2023      DEXA:   No results found for this or any previous visit from the past 3650 days.     Colon Cancer Screenin/23 wnl repeat 5 years,     Smoking Status:   Tobacco Use      Smoking status: Former        Packs/day: 0.00        Types: Cigarettes        Quit date:         Years since quittin.8        Passive exposure: Never      Smokeless tobacco: Former   Quit over 10 years  Lung Cancer Screening:  CT Low Dose n/a     Exercise: walking  Diet:    Immunizations:  Flu vaccine- Recommended every fall  COVID vaccine primary series- complete  Tetanus- Tdap   Shingrix- series not completed  RSV-not recommended  Pneumovax 23-  N/A  Prevnar 20- at age 67       Mr#: [id number]      Past Medical History:   Diagnosis Date    Cancer (HCC) 2021    Hypertension     Kidney disease     Kidney stone        Past Surgical History:   Procedure Laterality Date    BREAST SURGERY      TUBAL LIGATION         Family History   Problem Relation Age of Onset    Breast Cancer Sister        Allergies   Allergen Reactions    Amoxicillin-Pot

## 2024-11-04 NOTE — PROGRESS NOTES
Ayesha Spencer is a 69 y.o. female (: 1955) presenting to address:    Chief Complaint   Patient presents with    New Patient     Left Knee Pain and Leg Hurts       Vitals:    24 1417   BP: 113/71   Pulse: 67   Resp: 16   Temp: 97.8 °F (36.6 °C)   SpO2: 98%       \"Have you been to the ER, urgent care clinic since your last visit?  Hospitalized since your last visit?\"    NO    “Have you seen or consulted any other health care providers outside of [facility] since your last visit?”    NO    “Have you had a colorectal cancer screening such as a colonoscopy/FIT/Cologuard?    Not due for another 3 years    No colonoscopy on file  No cologuard on file  No FIT/FOBT on file   No flexible sigmoidoscopy on file        Have you had a mammogram?”   Yes, 10/17/24     Date of last Mammogram: 10/11/2022

## 2024-11-22 LAB
ALBUMIN/CREAT UR: 10 MG/G CREAT (ref 0–29)
BUN SERPL-MCNC: 18 MG/DL (ref 8–27)
BUN/CREAT SERPL: 18 (ref 12–28)
CALCIUM SERPL-MCNC: 9.7 MG/DL (ref 8.7–10.3)
CHLORIDE SERPL-SCNC: 99 MMOL/L (ref 96–106)
CHOLEST SERPL-MCNC: 204 MG/DL (ref 100–199)
CO2 SERPL-SCNC: 24 MMOL/L (ref 20–29)
CREAT SERPL-MCNC: 1.01 MG/DL (ref 0.57–1)
CREAT UR-MCNC: 71 MG/DL
EGFRCR SERPLBLD CKD-EPI 2021: 61 ML/MIN/1.73
ERYTHROCYTE [DISTWIDTH] IN BLOOD BY AUTOMATED COUNT: 13.8 % (ref 11.7–15.4)
GLUCOSE SERPL-MCNC: 152 MG/DL (ref 70–99)
HBA1C MFR BLD: 7 % (ref 4.8–5.6)
HCT VFR BLD AUTO: 37.5 % (ref 34–46.6)
HDLC SERPL-MCNC: 67 MG/DL
HGB BLD-MCNC: 12 G/DL (ref 11.1–15.9)
LDLC SERPL CALC-MCNC: 120 MG/DL (ref 0–99)
MCH RBC QN AUTO: 29.1 PG (ref 26.6–33)
MCHC RBC AUTO-ENTMCNC: 32 G/DL (ref 31.5–35.7)
MCV RBC AUTO: 91 FL (ref 79–97)
MICROALBUMIN UR-MCNC: 7 UG/ML
PLATELET # BLD AUTO: 179 X10E3/UL (ref 150–450)
POTASSIUM SERPL-SCNC: 3.6 MMOL/L (ref 3.5–5.2)
RBC # BLD AUTO: 4.13 X10E6/UL (ref 3.77–5.28)
SODIUM SERPL-SCNC: 140 MMOL/L (ref 134–144)
T4 FREE SERPL-MCNC: 1.73 NG/DL (ref 0.82–1.77)
TRIGL SERPL-MCNC: 97 MG/DL (ref 0–149)
TSH SERPL DL<=0.005 MIU/L-ACNC: 5.07 UIU/ML (ref 0.45–4.5)
VLDLC SERPL CALC-MCNC: 17 MG/DL (ref 5–40)
WBC # BLD AUTO: 5.2 X10E3/UL (ref 3.4–10.8)

## 2024-11-26 ENCOUNTER — OFFICE VISIT (OUTPATIENT)
Age: 69
End: 2024-11-26
Payer: MEDICARE

## 2024-11-26 VITALS — BODY MASS INDEX: 28.86 KG/M2 | WEIGHT: 173.2 LBS | HEIGHT: 65 IN

## 2024-11-26 DIAGNOSIS — M25.562 CHRONIC PAIN OF LEFT KNEE: Primary | ICD-10-CM

## 2024-11-26 DIAGNOSIS — G89.29 CHRONIC PAIN OF LEFT KNEE: Primary | ICD-10-CM

## 2024-11-26 DIAGNOSIS — M17.12 PRIMARY OSTEOARTHRITIS OF LEFT KNEE: ICD-10-CM

## 2024-11-26 PROCEDURE — 73564 X-RAY EXAM KNEE 4 OR MORE: CPT

## 2024-11-26 PROCEDURE — 1123F ACP DISCUSS/DSCN MKR DOCD: CPT

## 2024-11-26 PROCEDURE — 99204 OFFICE O/P NEW MOD 45 MIN: CPT

## 2024-11-26 PROCEDURE — 20610 DRAIN/INJ JOINT/BURSA W/O US: CPT

## 2024-11-26 RX ORDER — LIDOCAINE HYDROCHLORIDE 10 MG/ML
2 INJECTION, SOLUTION INFILTRATION; PERINEURAL ONCE
Status: COMPLETED | OUTPATIENT
Start: 2024-11-26 | End: 2024-11-26

## 2024-11-26 RX ORDER — TRIAMCINOLONE ACETONIDE 40 MG/ML
40 INJECTION, SUSPENSION INTRA-ARTICULAR; INTRAMUSCULAR ONCE
Status: COMPLETED | OUTPATIENT
Start: 2024-11-26 | End: 2024-11-26

## 2024-11-26 RX ADMIN — LIDOCAINE HYDROCHLORIDE 2 ML: 10 INJECTION, SOLUTION INFILTRATION; PERINEURAL at 15:13

## 2024-11-26 RX ADMIN — TRIAMCINOLONE ACETONIDE 40 MG: 40 INJECTION, SUSPENSION INTRA-ARTICULAR; INTRAMUSCULAR at 15:13

## 2024-11-26 NOTE — ASSESSMENT & PLAN NOTE
After explaining potential risk of infection, skin discoloration, hyperglycemia, or flushing, I obtained written consent the patient would like to move forward with a left knee intra-articular injection the patient was prepped in normal sterile fashion with freeze spray and alcohol.  40mg kenalog and 2mL 2% lidocaine was injected .  The needle was withdrawn, the area was cleansed and a sterile bandage was applied.  The patient tolerated the procedure well.

## 2024-11-26 NOTE — PROGRESS NOTES
Patient: Ayesha Spencer                MRN: [id number]       SSN: [id number]  YOB: 1955        AGE: 69 y.o.        SEX: female  BMI: Body mass index is 28.82 kg/m².    PCP: Chloe Valero,   11/26/24    Chief Complaint: New Patient (Left knee pain and swelling)      1. Chronic pain of left knee  -     [53132] Knee 4V  2. Primary osteoarthritis of left knee  Assessment & Plan:  After explaining potential risk of infection, skin discoloration, hyperglycemia, or flushing, I obtained written consent the patient would like to move forward with a left knee intra-articular injection the patient was prepped in normal sterile fashion with freeze spray and alcohol.  40mg kenalog and 2mL 2% lidocaine was injected .  The needle was withdrawn, the area was cleansed and a sterile bandage was applied.  The patient tolerated the procedure well.         Orders:  -     DRAIN/INJECT LARGE JOINT/BURSA  -     lidocaine 1 % injection 2 mL; 2 mL, Intra-artICUlar, ONCE, 1 dose, On Tue 11/26/24 at 1500  -     triamcinolone acetonide (KENALOG-40) injection 40 mg; 40 mg, Other, ONCE, 1 dose, On Tue 11/26/24 at 1500        HPI:  Ayesha Spencer is a 69 y.o. female with chief complaint of   Chief Complaint   Patient presents with    New Patient     Left knee pain and swelling       Patient presents today with left knee pain since June 2024. She denies any injuries. Patient has gotten good relief from over-the-counter anti-inflammatories such as ibuprofen.  Other than that she has received no treatment for her knee pain.  She reports that she is very active and has increased pain with going up and down stairs and standing for more than 15 minutes.  She also ices her knee and does get some relief from this.  She reports intermittent swelling and increased aching at night.  She also reports that her knee will occasionally feel like it gives out on her.  She locates her pain at the upper outer portion of her knee

## 2025-01-07 ENCOUNTER — OFFICE VISIT (OUTPATIENT)
Age: 70
End: 2025-01-07
Payer: MEDICARE

## 2025-01-07 VITALS — WEIGHT: 173 LBS | BODY MASS INDEX: 28.82 KG/M2 | HEIGHT: 65 IN

## 2025-01-07 DIAGNOSIS — G89.29 CHRONIC PAIN OF LEFT KNEE: Primary | ICD-10-CM

## 2025-01-07 DIAGNOSIS — M17.12 PRIMARY OSTEOARTHRITIS OF LEFT KNEE: ICD-10-CM

## 2025-01-07 DIAGNOSIS — M25.562 CHRONIC PAIN OF LEFT KNEE: Primary | ICD-10-CM

## 2025-01-07 DIAGNOSIS — E11.9 TYPE 2 DIABETES MELLITUS WITHOUT COMPLICATION, WITHOUT LONG-TERM CURRENT USE OF INSULIN (HCC): ICD-10-CM

## 2025-01-07 PROCEDURE — 1123F ACP DISCUSS/DSCN MKR DOCD: CPT

## 2025-01-07 PROCEDURE — 1126F AMNT PAIN NOTED NONE PRSNT: CPT

## 2025-01-07 PROCEDURE — 99213 OFFICE O/P EST LOW 20 MIN: CPT

## 2025-01-07 NOTE — PROGRESS NOTES
Patient: Ayesha Spencer                MRN: [id number]       SSN: [id number]  YOB: 1955        AGE: 69 y.o.        SEX: female  BMI: Body mass index is 28.79 kg/m².    PCP: Chloe Valero DO  01/07/25    Chief Complaint: Knee Pain (Left knee)      1. Chronic pain of left knee  2. Primary osteoarthritis of left knee  3. Type 2 diabetes mellitus without complication, without long-term current use of insulin ([facility])          HPI:  Ayesha Spencer is a 69 y.o. female with chief complaint of   Chief Complaint   Patient presents with    Knee Pain     Left knee       Patient presents today with left knee pain since June 2024. She denies any injuries. Patient has gotten good relief from over-the-counter anti-inflammatories such as ibuprofen.  Other than that she has received no treatment for her knee pain.  She reports that she is very active and has increased pain with going up and down stairs and standing for more than 15 minutes.  She also ices her knee and does get some relief from this.  She reports intermittent swelling and increased aching at night.  She also reports that her knee will occasionally feel like it gives out on her.  She locates her pain at the upper outer portion of her knee level to her kneecap.    They have a past medical history of osteopenia, hypertension, diabetes, hyperlipidemia, hypothyroidism, and history of breast cancer.     IMAGING:  Imaging read by myself and interpreted as follows:    4 view x-ray of the left knee including AP, notch, lateral, and sunrise demonstrates joint space narrowing most significant in the medial compartment with condylar squaring and marginal osteophytosis.  There is a calcification at the superolateral patella without evidence of lytic or blastic changes.        PHYSICAL EXAMINATION:  Ht 1.651 m (5' 5\")   Wt 78.5 kg (173 lb)   BMI 28.79 kg/m²   Body mass index is 28.79 kg/m².  Wt Readings from Last 3 Encounters:   01/07/25 78.5 kg

## 2025-01-09 ENCOUNTER — OFFICE VISIT (OUTPATIENT)
Dept: FAMILY MEDICINE CLINIC | Facility: CLINIC | Age: 70
End: 2025-01-09
Payer: MEDICARE

## 2025-01-09 VITALS
SYSTOLIC BLOOD PRESSURE: 130 MMHG | OXYGEN SATURATION: 97 % | TEMPERATURE: 97.1 F | WEIGHT: 167 LBS | BODY MASS INDEX: 27.82 KG/M2 | RESPIRATION RATE: 16 BRPM | HEART RATE: 77 BPM | HEIGHT: 65 IN | DIASTOLIC BLOOD PRESSURE: 78 MMHG

## 2025-01-09 DIAGNOSIS — R05.3 PERSISTENT COUGH: Primary | ICD-10-CM

## 2025-01-09 DIAGNOSIS — R05.3 PERSISTENT COUGH FOR 3 WEEKS OR LONGER: ICD-10-CM

## 2025-01-09 PROCEDURE — 3078F DIAST BP <80 MM HG: CPT | Performed by: FAMILY MEDICINE

## 2025-01-09 PROCEDURE — 99213 OFFICE O/P EST LOW 20 MIN: CPT | Performed by: FAMILY MEDICINE

## 2025-01-09 PROCEDURE — 1159F MED LIST DOCD IN RCRD: CPT | Performed by: FAMILY MEDICINE

## 2025-01-09 PROCEDURE — 1123F ACP DISCUSS/DSCN MKR DOCD: CPT | Performed by: FAMILY MEDICINE

## 2025-01-09 PROCEDURE — 1126F AMNT PAIN NOTED NONE PRSNT: CPT | Performed by: FAMILY MEDICINE

## 2025-01-09 PROCEDURE — 1160F RVW MEDS BY RX/DR IN RCRD: CPT | Performed by: FAMILY MEDICINE

## 2025-01-09 PROCEDURE — 3075F SYST BP GE 130 - 139MM HG: CPT | Performed by: FAMILY MEDICINE

## 2025-01-09 RX ORDER — BENZONATATE 200 MG/1
200 CAPSULE ORAL 3 TIMES DAILY PRN
Qty: 30 CAPSULE | Refills: 1 | Status: SHIPPED | OUTPATIENT
Start: 2025-01-09

## 2025-01-09 RX ORDER — ALBUTEROL SULFATE 90 UG/1
2 INHALANT RESPIRATORY (INHALATION) EVERY 4 HOURS PRN
Qty: 18 G | Refills: 1 | Status: SHIPPED | OUTPATIENT
Start: 2025-01-09

## 2025-01-09 ASSESSMENT — ENCOUNTER SYMPTOMS
SORE THROAT: 0
WHEEZING: 1
COUGH: 1
SHORTNESS OF BREATH: 0

## 2025-01-09 ASSESSMENT — PATIENT HEALTH QUESTIONNAIRE - PHQ9
SUM OF ALL RESPONSES TO PHQ QUESTIONS 1-9: 0
SUM OF ALL RESPONSES TO PHQ9 QUESTIONS 1 & 2: 0
2. FEELING DOWN, DEPRESSED OR HOPELESS: NOT AT ALL
SUM OF ALL RESPONSES TO PHQ QUESTIONS 1-9: 0
1. LITTLE INTEREST OR PLEASURE IN DOING THINGS: NOT AT ALL

## 2025-01-09 NOTE — PROGRESS NOTES
HISTORY OF PRESENT ILLNESS  Ayesha Spencer  is a 69 y.o. y.o. female    She reports concern about a persistent cough with worsening symptoms off and on for the past 6 months.  Acknowledges some episodes of wheezing.  She has not experienced fever or productive cough.  She has an extensive history of environmental allergies and is followed by an allergist and is treated with antihistamines (fexofenadine hydrochloride).  She reports reflux symptoms adequately controlled on famotidine.  She has a history of type 2 diabetes and is not a good candidate for prednisone and also reports inability to tolerate prednisone.        Mr#: [id number]      Past Medical History:   Diagnosis Date    Cancer ([facility]) 2021    Hypertension     Kidney disease     Kidney stone        Past Surgical History:   Procedure Laterality Date    BREAST SURGERY      TUBAL LIGATION         Family History   Problem Relation Age of Onset    Breast Cancer Sister        Allergies   Allergen Reactions    Amoxicillin-Pot Clavulanate Diarrhea    Doxycycline Diarrhea    Corticosteroids Hives and Other (See Comments)    Naproxen Hives and Other (See Comments)    Pseudoephedrine-Guaifenesin Hives    Statins Other (See Comments) and Palpitations    Colesevelam Other (See Comments)    Ezetimibe Other (See Comments)    Moxifloxacin Other (See Comments)    Nsaids Other (See Comments)    Phenylephrine-Guaifenesin Itching    Sitagliptin Other (See Comments)       Social History     Tobacco Use   Smoking Status Former    Current packs/day: 0.00    Types: Cigarettes    Quit date:     Years since quittin.0    Passive exposure: Never   Smokeless Tobacco Former       Social History     Substance and Sexual Activity   Alcohol Use Not Currently       Immunization History   Administered Date(s) Administered    COVID-19, PFIZER PURPLE top, DILUTE for use, (age 12 y+), 30mcg/0.3mL 2021, 2021    Influenza Virus Vaccine 2007, 10/30/2008,

## 2025-01-09 NOTE — PROGRESS NOTES
Ayesha Spencer is a 69 y.o. female (: 1955) presenting to address:    Chief Complaint   Patient presents with    Cough     Started this summer . Seen Dr cook and given allergy medication and takes prn. Noemi time runny nose and cough developed got better but now has returned comes and goes . Cough is worse night . Robitussin and benadryl.         Vitals:    25 1137   BP: 130/78   Pulse: 77   Resp: 16   Temp: 97.1 °F (36.2 °C)   SpO2: 97%       \"Have you been to the ER, urgent care clinic since your last visit?  Hospitalized since your last visit?\"    NO    “Have you seen or consulted any other health care providers outside of [facility] since your last visit?”    NO    “Have you had a colorectal cancer screening such as a colonoscopy/FIT/Cologuard?    NO    No colonoscopy on file  No cologuard on file  No FIT/FOBT on file   No flexible sigmoidoscopy on file        Have you had a mammogram?”   NO    Date of last Mammogram: 10/11/2022

## 2025-01-09 NOTE — PATIENT INSTRUCTIONS
Current Status:  Persistent cough with probable multifactorial etiology, no history or exam findings to suggest a bacterial infection    Plan:  Benzonatate capsules, 200 mg up to 3 times daily as needed for cough  Albuterol inhaler 2 puffs up to every 4 hours if needed for wheezing and paroxysms of cough  Cancel appointment with PCP on 1/13/2025 since she has a conflicting appointment with urology  Reschedule PCP appointment as soon as possible  Please always arrive at least 15 minutes before your scheduled appointment time.

## 2025-01-27 ENCOUNTER — OFFICE VISIT (OUTPATIENT)
Dept: FAMILY MEDICINE CLINIC | Facility: CLINIC | Age: 70
End: 2025-01-27
Payer: MEDICARE

## 2025-01-27 VITALS
OXYGEN SATURATION: 100 % | TEMPERATURE: 96.8 F | HEIGHT: 65 IN | BODY MASS INDEX: 27.66 KG/M2 | HEART RATE: 80 BPM | SYSTOLIC BLOOD PRESSURE: 137 MMHG | WEIGHT: 166 LBS | RESPIRATION RATE: 16 BRPM | DIASTOLIC BLOOD PRESSURE: 80 MMHG

## 2025-01-27 DIAGNOSIS — R05.3 CHRONIC COUGH: Primary | ICD-10-CM

## 2025-01-27 DIAGNOSIS — I10 PRIMARY HYPERTENSION: ICD-10-CM

## 2025-01-27 DIAGNOSIS — E11.9 TYPE 2 DIABETES MELLITUS WITHOUT COMPLICATION, WITHOUT LONG-TERM CURRENT USE OF INSULIN (HCC): ICD-10-CM

## 2025-01-27 PROCEDURE — 3075F SYST BP GE 130 - 139MM HG: CPT | Performed by: STUDENT IN AN ORGANIZED HEALTH CARE EDUCATION/TRAINING PROGRAM

## 2025-01-27 PROCEDURE — 3079F DIAST BP 80-89 MM HG: CPT | Performed by: STUDENT IN AN ORGANIZED HEALTH CARE EDUCATION/TRAINING PROGRAM

## 2025-01-27 PROCEDURE — 99214 OFFICE O/P EST MOD 30 MIN: CPT | Performed by: STUDENT IN AN ORGANIZED HEALTH CARE EDUCATION/TRAINING PROGRAM

## 2025-01-27 PROCEDURE — 1123F ACP DISCUSS/DSCN MKR DOCD: CPT | Performed by: STUDENT IN AN ORGANIZED HEALTH CARE EDUCATION/TRAINING PROGRAM

## 2025-01-27 SDOH — ECONOMIC STABILITY: FOOD INSECURITY: WITHIN THE PAST 12 MONTHS, THE FOOD YOU BOUGHT JUST DIDN'T LAST AND YOU DIDN'T HAVE MONEY TO GET MORE.: NEVER TRUE

## 2025-01-27 SDOH — ECONOMIC STABILITY: FOOD INSECURITY: WITHIN THE PAST 12 MONTHS, YOU WORRIED THAT YOUR FOOD WOULD RUN OUT BEFORE YOU GOT MONEY TO BUY MORE.: NEVER TRUE

## 2025-01-27 ASSESSMENT — ENCOUNTER SYMPTOMS
CHEST TIGHTNESS: 0
SINUS PAIN: 0
SHORTNESS OF BREATH: 0

## 2025-01-27 NOTE — PROGRESS NOTES
Ayesha Spencer is a 69 y.o. female (: 1955) presenting to address:    Chief Complaint   Patient presents with    Cough     Is good; Runny Nose still not as bad  Thickness in Throat    Lab Results       Vitals:    25 1143   BP: 137/80   Pulse: 80   Resp: 16   Temp: 96.8 °F (36 °C)   SpO2: 100%       \"Have you been to the ER, urgent care clinic since your last visit?  Hospitalized since your last visit?\"    NO    “Have you seen or consulted any other health care providers outside of [facility] since your last visit?”    Yes, Kidney Stone- Urology    “Have you had a colorectal cancer screening such as a colonoscopy/FIT/Cologuard?    Yes,     No colonoscopy on file  No cologuard on file  No FIT/FOBT on file   No flexible sigmoidoscopy on file        Have you had a mammogram?”   Yes, 2025    Date of last Mammogram: 10/11/2022

## 2025-01-27 NOTE — PROGRESS NOTES
[facility]    HISTORY OF PRESENT ILLNESS  Ayesha Spencer  is a 69 y.o. y.o. female here for FU.    Chronic Cough  -was seen for acute visit a few weeks ago  -was prescribed tessalon pearls and albuterol  -still present, but improved  -maintains some phlegm and congestion      TYPE 2 DIABETES MELLITUS  Non-Insulin Dependent  Reports Compliance with Medication  metFORMIN - 1000 MG  pioglitazone - 30 MG   -following endocrinology Dr. Selby   Exercise:  Diet:  Last Dilated Eye Exam:  Foot Exam:  Hypoglycemia: Denies   Fasting Blood Sugars: 150s  Peripheral Neuropathy   Hemoglobin A1C   Date Value Ref Range Status   11/21/2024 7.0 (H) 4.8 - 5.6 % Final     Comment:                 Prediabetes: 5.7 - 6.4           Diabetes: >6.4           Glycemic control for adults with diabetes: <7.0         HYPERTENSION, Essential  Reports Compliance with meds losartan 100, atenolol 50, amlodipine 5, HCTZ 12.5   Denies Chest pain, shortness of breath, lower extremity edema, vision changes, change in urination.    Lab Results   Component Value Date     11/21/2024    K 3.6 11/21/2024    CL 99 11/21/2024    CO2 24 11/21/2024    GLUCOSE 152 (H) 11/21/2024    BUN 18 11/21/2024    CREATININE 1.01 (H) 11/21/2024    CALCIUM 9.7 11/21/2024    ALT 17 01/18/2023    AST 11 01/18/2023    ALKPHOS 98 01/18/2023       HLD  -crestor 5 every other day  -has tried many different statins and gets leg cramps  -following cardiologist Dr. Aldrich     Mr#: [id number]      Past Medical History:   Diagnosis Date    Cancer (HCC) 09/27/2021    Hypertension     Kidney disease     Kidney stone        Past Surgical History:   Procedure Laterality Date    BREAST SURGERY      TUBAL LIGATION         Family History   Problem Relation Age of Onset    Breast Cancer Sister        Allergies   Allergen Reactions    Amoxicillin-Pot Clavulanate Diarrhea    Doxycycline Diarrhea    Corticosteroids Hives and Other (See Comments)    Naproxen Hives and

## 2025-03-17 ENCOUNTER — OFFICE VISIT (OUTPATIENT)
Dept: FAMILY MEDICINE CLINIC | Facility: CLINIC | Age: 70
End: 2025-03-17
Payer: MEDICARE

## 2025-03-17 VITALS
OXYGEN SATURATION: 100 % | SYSTOLIC BLOOD PRESSURE: 138 MMHG | WEIGHT: 168 LBS | TEMPERATURE: 97.3 F | HEIGHT: 65 IN | BODY MASS INDEX: 27.99 KG/M2 | HEART RATE: 65 BPM | RESPIRATION RATE: 16 BRPM | DIASTOLIC BLOOD PRESSURE: 77 MMHG

## 2025-03-17 DIAGNOSIS — E03.9 HYPOTHYROIDISM, UNSPECIFIED TYPE: Primary | ICD-10-CM

## 2025-03-17 DIAGNOSIS — E11.9 TYPE 2 DIABETES MELLITUS WITHOUT COMPLICATION, WITHOUT LONG-TERM CURRENT USE OF INSULIN: ICD-10-CM

## 2025-03-17 DIAGNOSIS — I10 ESSENTIAL HYPERTENSION: ICD-10-CM

## 2025-03-17 PROCEDURE — 3078F DIAST BP <80 MM HG: CPT | Performed by: STUDENT IN AN ORGANIZED HEALTH CARE EDUCATION/TRAINING PROGRAM

## 2025-03-17 PROCEDURE — 1123F ACP DISCUSS/DSCN MKR DOCD: CPT | Performed by: STUDENT IN AN ORGANIZED HEALTH CARE EDUCATION/TRAINING PROGRAM

## 2025-03-17 PROCEDURE — 3075F SYST BP GE 130 - 139MM HG: CPT | Performed by: STUDENT IN AN ORGANIZED HEALTH CARE EDUCATION/TRAINING PROGRAM

## 2025-03-17 PROCEDURE — 99214 OFFICE O/P EST MOD 30 MIN: CPT | Performed by: STUDENT IN AN ORGANIZED HEALTH CARE EDUCATION/TRAINING PROGRAM

## 2025-03-17 ASSESSMENT — ENCOUNTER SYMPTOMS
SHORTNESS OF BREATH: 0
SINUS PAIN: 0
CHEST TIGHTNESS: 0

## 2025-03-17 NOTE — PROGRESS NOTES
[facility]    HISTORY OF PRESENT ILLNESS  Ayesha Spencer  is a 69 y.o. y.o. female here for acute visit.    HLD  -was taking rosuvastatin 5 every other day but stopped it because it made her feel joint pain, especially in L arm  -stopped it 3 weeks ago and is feeling better  -labs 11/24     osteoporosis  -Prolia injection    HYPERTENSION, Essential  Reports Compliance with meds amlodipine 5, atenolol 50, hctz 12.5, losartan 100  Checking BP at Home: YES  Denies Chest pain, shortness of breath, lower extremity edema, vision changes, change in urination.    Lab Results   Component Value Date     11/21/2024    K 3.6 11/21/2024    CL 99 11/21/2024    CO2 24 11/21/2024    GLUCOSE 152 (H) 11/21/2024    BUN 18 11/21/2024    CREATININE 1.01 (H) 11/21/2024    CALCIUM 9.7 11/21/2024    ALT 17 01/18/2023    AST 11 01/18/2023    ALKPHOS 98 01/18/2023       TYPE 2 DIABETES MELLITUS  Non-Insulin Dependent  Reports Compliance with Medication  metFORMIN - 500 MG  pioglitazone - 30 MG   Patient admits that metformin 1000 pill is difficult to swallow especially at night    Hemoglobin A1C   Date Value Ref Range Status   11/21/2024 7.0 (H) 4.8 - 5.6 % Final     Comment:                 Prediabetes: 5.7 - 6.4           Diabetes: >6.4           Glycemic control for adults with diabetes: <7.0           Mr#: [id number]      Past Medical History:   Diagnosis Date    Cancer (HCC) 09/27/2021    Hypertension     Kidney disease     Kidney stone        Past Surgical History:   Procedure Laterality Date    BREAST SURGERY      TUBAL LIGATION         Family History   Problem Relation Age of Onset    Breast Cancer Sister        Allergies   Allergen Reactions    Amoxicillin-Pot Clavulanate Diarrhea    Doxycycline Diarrhea    Corticosteroids Hives and Other (See Comments)    Naproxen Hives and Other (See Comments)    Pseudoephedrine-Guaifenesin Hives    Statins Other (See Comments) and Palpitations    Colesevelam

## 2025-03-17 NOTE — PROGRESS NOTES
Ayesha Spencer is a 69 y.o. female (: 1955) presenting to address:    Chief Complaint   Patient presents with    Medication Check       Vitals:    25 1301   BP: 138/77   Pulse: 65   Resp: 16   Temp: 97.3 °F (36.3 °C)   SpO2: 100%       \"Have you been to the ER, urgent care clinic since your last visit?  Hospitalized since your last visit?\"    NO    “Have you seen or consulted any other health care providers outside of [facility] since your last visit?”    YES - When: approximately 1 months ago.  Where and Why: Dr oRblero for check-up.    “Have you had a colorectal cancer screening such as a colonoscopy/FIT/Cologuard?    YES - Type: Colonoscopy - Where: [facility] with Dr Pina. Nurse/CMA to request most recent records if not in the chart     No colonoscopy on file  No cologuard on file  No FIT/FOBT on file   No flexible sigmoidoscopy on file        Have you had a mammogram?”   YES - Where: Unknown Nurse/CMA to request most recent records if not in the chart    Date of last Mammogram: 10/11/2022

## 2025-03-18 LAB
T4 FREE SERPL-MCNC: 1.52 NG/DL (ref 0.82–1.77)
TSH SERPL DL<=0.005 MIU/L-ACNC: 4.24 UIU/ML (ref 0.45–4.5)

## 2025-03-19 ENCOUNTER — RESULTS FOLLOW-UP (OUTPATIENT)
Dept: FAMILY MEDICINE CLINIC | Facility: CLINIC | Age: 70
End: 2025-03-19

## 2025-03-20 ENCOUNTER — TELEPHONE (OUTPATIENT)
Dept: FAMILY MEDICINE CLINIC | Facility: CLINIC | Age: 70
End: 2025-03-20

## 2025-03-20 NOTE — TELEPHONE ENCOUNTER
Patient would like to know if she can get a cortisone shot on top of the Prolia Injection that received on the 12th. Please advise.

## 2025-03-21 DIAGNOSIS — E11.9 TYPE 2 DIABETES MELLITUS WITHOUT COMPLICATION, WITHOUT LONG-TERM CURRENT USE OF INSULIN: ICD-10-CM

## 2025-03-21 DIAGNOSIS — I10 ESSENTIAL HYPERTENSION: ICD-10-CM

## 2025-03-21 RX ORDER — ATENOLOL 50 MG/1
50 TABLET ORAL DAILY
Qty: 90 TABLET | Refills: 1 | Status: SHIPPED | OUTPATIENT
Start: 2025-03-21

## 2025-03-21 RX ORDER — HYDROCHLOROTHIAZIDE 12.5 MG/1
12.5 TABLET ORAL DAILY
Qty: 90 TABLET | Refills: 3 | Status: SHIPPED | OUTPATIENT
Start: 2025-03-21

## 2025-03-21 RX ORDER — LOSARTAN POTASSIUM 100 MG/1
100 TABLET ORAL DAILY
Qty: 90 TABLET | Refills: 1 | Status: SHIPPED | OUTPATIENT
Start: 2025-03-21

## 2025-03-21 RX ORDER — AMLODIPINE BESYLATE 5 MG/1
5 TABLET ORAL DAILY
Qty: 90 TABLET | Refills: 1 | Status: SHIPPED | OUTPATIENT
Start: 2025-03-21

## 2025-03-21 RX ORDER — PIOGLITAZONE 30 MG/1
30 TABLET ORAL DAILY
Qty: 90 TABLET | Refills: 1 | Status: SHIPPED | OUTPATIENT
Start: 2025-03-21

## 2025-03-21 RX ORDER — POTASSIUM CHLORIDE 750 MG/1
10 TABLET, EXTENDED RELEASE ORAL DAILY
Qty: 90 TABLET | Refills: 1 | Status: SHIPPED | OUTPATIENT
Start: 2025-03-21

## 2025-03-21 NOTE — TELEPHONE ENCOUNTER
Patient is requesting another meter ONE TOUCH VERIO FLEX. Patient stated that the top of the meter is lose and comes off at times

## 2025-03-21 NOTE — TELEPHONE ENCOUNTER
Requested Prescriptions     Pending Prescriptions Disp Refills    pioglitazone (ACTOS) 30 MG tablet 90 tablet 2     Sig: Take 1 tablet by mouth daily Indications: Diabetes    potassium chloride (KLOR-CON) 10 MEQ extended release tablet 90 tablet 1     Sig: Take 1 tablet by mouth daily    amLODIPine (NORVASC) 5 MG tablet 90 tablet 3     Sig: Take 1 tablet by mouth daily Indications: blood pressure    losartan (COZAAR) 100 MG tablet 90 tablet 1     Sig: Take 1 tablet by mouth daily Indications: blood pressure, kidney protection    atenolol (TENORMIN) 50 MG tablet 90 tablet 2     Sig: Take 1 tablet by mouth daily Indications: blood pressure    hydroCHLOROthiazide 12.5 MG tablet 90 tablet 3     Sig: Take 1 tablet by mouth daily Indications: blood pressure, water pill

## 2025-05-01 ENCOUNTER — TELEPHONE (OUTPATIENT)
Dept: FAMILY MEDICINE CLINIC | Facility: CLINIC | Age: 70
End: 2025-05-01

## 2025-05-01 DIAGNOSIS — E11.9 TYPE 2 DIABETES MELLITUS WITHOUT COMPLICATION, WITHOUT LONG-TERM CURRENT USE OF INSULIN (HCC): Primary | ICD-10-CM

## 2025-05-01 NOTE — TELEPHONE ENCOUNTER
PT called in requesting another meter One Touch Verio Flex. Pt stated that the top of the meter is lose.

## 2025-05-08 ENCOUNTER — OFFICE VISIT (OUTPATIENT)
Age: 70
End: 2025-05-08

## 2025-05-08 DIAGNOSIS — M17.12 PRIMARY OSTEOARTHRITIS OF LEFT KNEE: Primary | ICD-10-CM

## 2025-05-08 RX ORDER — LIDOCAINE HYDROCHLORIDE 10 MG/ML
2 INJECTION, SOLUTION INFILTRATION; PERINEURAL ONCE
Status: COMPLETED | OUTPATIENT
Start: 2025-05-08 | End: 2025-05-08

## 2025-05-08 RX ORDER — TRIAMCINOLONE ACETONIDE 40 MG/ML
40 INJECTION, SUSPENSION INTRA-ARTICULAR; INTRAMUSCULAR ONCE
Status: COMPLETED | OUTPATIENT
Start: 2025-05-08 | End: 2025-05-08

## 2025-05-08 RX ADMIN — LIDOCAINE HYDROCHLORIDE 2 ML: 10 INJECTION, SOLUTION INFILTRATION; PERINEURAL at 14:46

## 2025-05-08 RX ADMIN — TRIAMCINOLONE ACETONIDE 40 MG: 40 INJECTION, SUSPENSION INTRA-ARTICULAR; INTRAMUSCULAR at 14:47

## 2025-05-08 NOTE — PROGRESS NOTES
osteopenia, hypertension, diabetes, hyperlipidemia, hypothyroidism, and history of breast cancer.     IMAGING:  Imaging read by myself and interpreted as follows:    4 view x-ray of the left knee including AP, notch, lateral, and sunrise demonstrates joint space narrowing most significant in the medial compartment with condylar squaring and marginal osteophytosis.  There is a calcification at the superolateral patella without evidence of lytic or blastic changes.        PHYSICAL EXAMINATION:  There were no vitals taken for this visit.  There is no height or weight on file to calculate BMI.  Wt Readings from Last 3 Encounters:   03/17/25 76.2 kg (168 lb)   01/27/25 75.3 kg (166 lb)   01/13/25 76.2 kg (168 lb)     Hemoglobin A1C   Date Value Ref Range Status   11/21/2024 7.0 (H) 4.8 - 5.6 % Final     Comment:                 Prediabetes: 5.7 - 6.4           Diabetes: >6.4           Glycemic control for adults with diabetes: <7.0      No results found for: \"ZQC1KICT\"  No results found for: \"LABPROT\", \"LABALBU\"        GENERAL: Alert and oriented x3, in no acute distress.  HEENT: Normocephalic, atraumatic.    Heart: No JVD, RRR  Lung: No wheezing, speaks without SOB  MSK:   Examination Right knee Left knee   Skin  intact   Range of motion  0-120   Effusion  +   Medial joint line tenderness  -   Lateral joint line tenderness  -   Patella crepitus  +   Patella TTP  +, pain at the lateral aspect of patella and there is a palpable calcification off of the lateral aspect of the patella   Valgus stress  +pseudolaxity   Varus stress  -   Tenderness Pes Bursa  -   Anterior drawer  -   Posterior drawer  -   Neurovascular  intact   Calf Swelling and Tenderness to Palpation  -   Ladonna's Test  -   Radicular Tension  -   Leg edema  -      ASSESSMENT and PLAN:    May 8, 2025:  Left knee osteoarthritis, calcification of the superolateral aspect of patella.  Patient rates her pain 7 out of 10 today.  She states that about a month ago

## 2025-05-20 RX ORDER — LEVOTHYROXINE SODIUM 88 UG/1
88 TABLET ORAL DAILY
Qty: 90 TABLET | Refills: 1 | Status: SHIPPED | OUTPATIENT
Start: 2025-05-20 | End: 2025-05-21 | Stop reason: SDUPTHER

## 2025-05-20 NOTE — TELEPHONE ENCOUNTER
Last refilled 7/14/23 for 90 with 1 refill. Last ov 3/17/25   Future Appointments   Date Time Provider Department Center   5/27/2025  1:30 PM Chloe Eaton DO [facility] ECC DEP   7/15/2025  1:30 PM [facility] [place] Sched   7/21/2025  3:15 PM [facility] [place] Sched   7/21/2025  3:30 PM Elliott Martinez MD [facility] Sched

## 2025-05-21 RX ORDER — LEVOTHYROXINE SODIUM 88 UG/1
88 TABLET ORAL DAILY
Qty: 90 TABLET | Refills: 1 | Status: SHIPPED | OUTPATIENT
Start: 2025-05-21

## 2025-05-21 NOTE — TELEPHONE ENCOUNTER
Received request for Levothyroxine last refilled sent to local on 5/20/25 from [facility]. Last ov 3/17/25   Future Appointments   Date Time Provider Department Center   5/27/2025  1:30 PM Chloe Eaton DO [facility] ECC DEP   7/15/2025  1:30 PM [facility] [place] Sched   7/21/2025  3:15 PM [facility] X[facility]2 [facility] Sched   7/21/2025  3:30 PM Elliott Martinez MD [facility] Sched

## 2025-05-27 ENCOUNTER — OFFICE VISIT (OUTPATIENT)
Dept: FAMILY MEDICINE CLINIC | Facility: CLINIC | Age: 70
End: 2025-05-27
Payer: MEDICARE

## 2025-05-27 VITALS
RESPIRATION RATE: 14 BRPM | DIASTOLIC BLOOD PRESSURE: 76 MMHG | WEIGHT: 166.8 LBS | TEMPERATURE: 98.1 F | SYSTOLIC BLOOD PRESSURE: 136 MMHG | HEIGHT: 65 IN | HEART RATE: 70 BPM | BODY MASS INDEX: 27.79 KG/M2 | OXYGEN SATURATION: 98 %

## 2025-05-27 DIAGNOSIS — E78.2 MIXED HYPERLIPIDEMIA: ICD-10-CM

## 2025-05-27 DIAGNOSIS — E03.9 HYPOTHYROIDISM, UNSPECIFIED TYPE: ICD-10-CM

## 2025-05-27 DIAGNOSIS — Z28.21 HERPES ZOSTER VACCINATION DECLINED: ICD-10-CM

## 2025-05-27 DIAGNOSIS — Z00.00 WELCOME TO MEDICARE PREVENTIVE VISIT: ICD-10-CM

## 2025-05-27 DIAGNOSIS — Z00.00 WELCOME TO MEDICARE PREVENTIVE VISIT: Primary | ICD-10-CM

## 2025-05-27 DIAGNOSIS — Z71.89 ADVANCED CARE PLANNING/COUNSELING DISCUSSION: ICD-10-CM

## 2025-05-27 DIAGNOSIS — E11.9 TYPE 2 DIABETES MELLITUS WITHOUT COMPLICATION, WITHOUT LONG-TERM CURRENT USE OF INSULIN (HCC): ICD-10-CM

## 2025-05-27 PROCEDURE — 99214 OFFICE O/P EST MOD 30 MIN: CPT | Performed by: STUDENT IN AN ORGANIZED HEALTH CARE EDUCATION/TRAINING PROGRAM

## 2025-05-27 PROCEDURE — 1123F ACP DISCUSS/DSCN MKR DOCD: CPT | Performed by: STUDENT IN AN ORGANIZED HEALTH CARE EDUCATION/TRAINING PROGRAM

## 2025-05-27 PROCEDURE — 3078F DIAST BP <80 MM HG: CPT | Performed by: STUDENT IN AN ORGANIZED HEALTH CARE EDUCATION/TRAINING PROGRAM

## 2025-05-27 PROCEDURE — 3075F SYST BP GE 130 - 139MM HG: CPT | Performed by: STUDENT IN AN ORGANIZED HEALTH CARE EDUCATION/TRAINING PROGRAM

## 2025-05-27 PROCEDURE — 99497 ADVNCD CARE PLAN 30 MIN: CPT | Performed by: STUDENT IN AN ORGANIZED HEALTH CARE EDUCATION/TRAINING PROGRAM

## 2025-05-27 PROCEDURE — G0402 INITIAL PREVENTIVE EXAM: HCPCS | Performed by: STUDENT IN AN ORGANIZED HEALTH CARE EDUCATION/TRAINING PROGRAM

## 2025-05-27 RX ORDER — EZETIMIBE 10 MG/1
10 TABLET ORAL DAILY
COMMUNITY
Start: 2025-04-18

## 2025-05-27 RX ORDER — BLOOD SUGAR DIAGNOSTIC
STRIP MISCELLANEOUS
COMMUNITY
Start: 2025-04-07

## 2025-05-27 RX ORDER — LANCETS 33 GAUGE
EACH MISCELLANEOUS
COMMUNITY
Start: 2025-04-19

## 2025-05-27 ASSESSMENT — ENCOUNTER SYMPTOMS
SHORTNESS OF BREATH: 0
CHEST TIGHTNESS: 0
SINUS PAIN: 0

## 2025-05-27 ASSESSMENT — PATIENT HEALTH QUESTIONNAIRE - PHQ9
2. FEELING DOWN, DEPRESSED OR HOPELESS: NOT AT ALL
1. LITTLE INTEREST OR PLEASURE IN DOING THINGS: NOT AT ALL
SUM OF ALL RESPONSES TO PHQ QUESTIONS 1-9: 0

## 2025-05-27 ASSESSMENT — LIFESTYLE VARIABLES
HOW OFTEN DO YOU HAVE A DRINK CONTAINING ALCOHOL: NEVER
HOW MANY STANDARD DRINKS CONTAINING ALCOHOL DO YOU HAVE ON A TYPICAL DAY: PATIENT DOES NOT DRINK

## 2025-05-27 NOTE — PATIENT INSTRUCTIONS
Learning About Being Active as an Older Adult  Why is being active important as you get older?     Being active is one of the best things you can do for your health. And it's never too late to start. Being active--or getting active, if you aren't already--has definite benefits. It can:  Give you more energy,  Keep your mind sharp.  Improve balance to reduce your risk of falls.  Help you manage chronic illness with fewer medicines.  No matter how old you are, how fit you are, or what health problems you have, there is a form of activity that will work for you. And the more physical activity you can do, the better your overall health will be.  What kinds of activity can help you stay healthy?  Being more active will make your daily activities easier. Physical activity includes planned exercise and things you do in daily life. There are four types of activity:  Aerobic.  Doing aerobic activity makes your heart and lungs strong.  Includes walking, dancing, and gardening.  Aim for at least 2½ hours spread throughout the week.  It improves your energy and can help you sleep better.  Muscle-strengthening.  This type of activity can help maintain muscle and strengthen bones.  Includes climbing stairs, using resistance bands, and lifting or carrying heavy loads.  Aim for at least twice a week.  It can help protect the knees and other joints.  Stretching.  Stretching gives you better range of motion in joints and muscles.  Includes upper arm stretches, calf stretches, and gentle yoga.  Aim for at least twice a week, preferably after your muscles are warmed up from other activities.  It can help you function better in daily life.  Balancing.  This helps you stay coordinated and have good posture.  Includes heel-to-toe walking, william chi, and certain types of yoga.  Aim for at least 3 days a week.  It can reduce your risk of falling.  Even if you have a hard time meeting the recommendations, it's better to be more active

## 2025-05-27 NOTE — PROGRESS NOTES
Medicare Annual Wellness Visit    Ayesha Spencer is here for Medicare AWV    Assessment & Plan   Welcome to Medicare preventive visit  Comments:  labs today, declines shingles, provided ACP counseling  Orders:  -     CBC; Future  -     Basic Metabolic Panel; Future  Type 2 diabetes mellitus without complication, without long-term current use of insulin (HCC)  Comments:  well controlled, a1c today, fu in 6 mo  Orders:  -     Hemoglobin A1C; Future  -     Albumin/Creatinine Ratio, Urine; Future  Herpes zoster vaccination declined  Mixed hyperlipidemia  Comments:  check labs  Orders:  -     Lipid Panel; Future  Hypothyroidism, unspecified type  Comments:  labs today  Orders:  -     TSH + Free T4 Panel; Future  Advanced care planning/counseling discussion  Comments:  provided info     Return in about 6 months (around 11/27/2025) for DM, HTN.     Subjective       Patient's complete Health Risk Assessment and screening values have been reviewed and are found in Flowsheets. The following problems were reviewed today and where indicated follow up appointments were made and/or referrals ordered.    Positive Risk Factor Screenings with Interventions:              Inactivity:  On average, how many days per week do you engage in moderate to strenuous exercise (like a brisk walk)?: 1 day (!) Abnormal  On average, how many minutes do you engage in exercise at this level?: 60 min  Interventions:  See AVS for additional education material           Advanced Directives:  Do you have a Living Will?: (!) No    Intervention:  has NO advanced directive - information provided    Advance Care Planning   Discussed the patient’s choices for care and treatment preferences in case of a health event that adversely affects decision-making abilities or is life-limiting. Recommended the patient document care preferences in state-specific advance directives. Also reviewed the process of designating a trusted capable adult as an Agent (or Health 
HEMOGLOBIN A1C  -     Hemoglobin A1C; Future  -     Albumin/Creatinine Ratio, Urine; Future    Herpes zoster vaccination declined    Mixed hyperlipidemia  Comments:  check labs  Orders:  -     Lipid Panel; Future    Hypothyroidism, unspecified type  Comments:  labs today  Orders:  -     TSH + Free T4 Panel; Future    Advanced care planning/counseling discussion  Comments:  provided info                 Sharon Guerrero D.O.      PLEASE NOTE:   This document has been produced using voice recognition software. Unrecognized errors in transcription may be present.

## 2025-05-28 ENCOUNTER — RESULTS FOLLOW-UP (OUTPATIENT)
Dept: FAMILY MEDICINE CLINIC | Facility: CLINIC | Age: 70
End: 2025-05-28

## 2025-05-28 LAB
ALBUMIN/CREAT UR: 11 MG/G CREAT (ref 0–29)
BUN SERPL-MCNC: 20 MG/DL (ref 8–27)
BUN/CREAT SERPL: 18 (ref 12–28)
CALCIUM SERPL-MCNC: 10.2 MG/DL (ref 8.7–10.3)
CHLORIDE SERPL-SCNC: 99 MMOL/L (ref 96–106)
CHOLEST SERPL-MCNC: 253 MG/DL (ref 100–199)
CO2 SERPL-SCNC: 24 MMOL/L (ref 20–29)
CREAT SERPL-MCNC: 1.11 MG/DL (ref 0.57–1)
CREAT UR-MCNC: 211.3 MG/DL
EGFRCR SERPLBLD CKD-EPI 2021: 54 ML/MIN/1.73
ERYTHROCYTE [DISTWIDTH] IN BLOOD BY AUTOMATED COUNT: 13.8 % (ref 11.7–15.4)
GLUCOSE SERPL-MCNC: 122 MG/DL (ref 70–99)
HBA1C MFR BLD: 6.9 % (ref 4.8–5.6)
HCT VFR BLD AUTO: 40.2 % (ref 34–46.6)
HDLC SERPL-MCNC: 76 MG/DL
HGB BLD-MCNC: 13.3 G/DL (ref 11.1–15.9)
LDLC SERPL CALC-MCNC: 159 MG/DL (ref 0–99)
MCH RBC QN AUTO: 29.9 PG (ref 26.6–33)
MCHC RBC AUTO-ENTMCNC: 33.1 G/DL (ref 31.5–35.7)
MCV RBC AUTO: 90 FL (ref 79–97)
MICROALBUMIN UR-MCNC: 23.2 UG/ML
PLATELET # BLD AUTO: 195 X10E3/UL (ref 150–450)
POTASSIUM SERPL-SCNC: 4.1 MMOL/L (ref 3.5–5.2)
RBC # BLD AUTO: 4.45 X10E6/UL (ref 3.77–5.28)
SODIUM SERPL-SCNC: 139 MMOL/L (ref 134–144)
T4 FREE SERPL-MCNC: 1.52 NG/DL (ref 0.82–1.77)
TRIGL SERPL-MCNC: 102 MG/DL (ref 0–149)
TSH SERPL DL<=0.005 MIU/L-ACNC: 5.29 UIU/ML (ref 0.45–4.5)
VLDLC SERPL CALC-MCNC: 18 MG/DL (ref 5–40)
WBC # BLD AUTO: 6.9 X10E3/UL (ref 3.4–10.8)

## 2025-05-30 ENCOUNTER — TELEPHONE (OUTPATIENT)
Dept: FAMILY MEDICINE CLINIC | Facility: CLINIC | Age: 70
End: 2025-05-30

## 2025-06-09 ENCOUNTER — COMMUNITY OUTREACH (OUTPATIENT)
Dept: FAMILY MEDICINE CLINIC | Facility: CLINIC | Age: 70
End: 2025-06-09

## 2025-06-09 NOTE — PROGRESS NOTES
Patient's HM shows they are overdue for Mammogram.  Care Everywhere and  files searched.  Results were obtained, and chart was updated.

## 2025-06-23 ENCOUNTER — TELEPHONE (OUTPATIENT)
Dept: FAMILY MEDICINE CLINIC | Facility: CLINIC | Age: 70
End: 2025-06-23

## 2025-06-24 ENCOUNTER — TELEPHONE (OUTPATIENT)
Dept: FAMILY MEDICINE CLINIC | Facility: CLINIC | Age: 70
End: 2025-06-24

## 2025-07-10 ENCOUNTER — OFFICE VISIT (OUTPATIENT)
Age: 70
End: 2025-07-10

## 2025-07-10 VITALS — HEIGHT: 65 IN | WEIGHT: 171.4 LBS | BODY MASS INDEX: 28.56 KG/M2

## 2025-07-10 DIAGNOSIS — M17.12 PRIMARY OSTEOARTHRITIS OF LEFT KNEE: Primary | ICD-10-CM

## 2025-07-10 RX ORDER — LIDOCAINE HYDROCHLORIDE 10 MG/ML
2 INJECTION, SOLUTION INFILTRATION; PERINEURAL ONCE
Status: SHIPPED | OUTPATIENT
Start: 2025-07-10

## 2025-07-10 NOTE — ASSESSMENT & PLAN NOTE
After obtaining written consent for a left knee intra-articular aspiration, the patient was laid supine with a bolster under the left knee. Superolateral portal was prepped with alcohol and freeze spray and 2 mL of 2% lidocaine were injected along the injection tract.  The needle was removed and the area was reprepped with Betadine.  An 18-gauge spinal needle with the stylus in place was then introduced intra-articularly.  The stylus was removed and exchanged for 60 mL syringe but no fluid was able to be drawn from the knee. The needle was then withdrawn, the area was cleansed and a sterile bandage was applied. The patient tolerated the procedure well.

## 2025-07-10 NOTE — PROGRESS NOTES
Patient: Ayesha Spencer                MRN: [id number]       SSN: [id number]  YOB: 1955        AGE: 69 y.o.        SEX: female  BMI: Body mass index is 28.52 kg/m².    PCP: Chloe Eaton DO  07/10/25    Chief Complaint: Follow-up (Left knee pain )      1. Primary osteoarthritis of left knee  Assessment & Plan:  After obtaining written consent for a left knee intra-articular aspiration, the patient was laid supine with a bolster under the left knee. Superolateral portal was prepped with alcohol and freeze spray and 2 mL of 2% lidocaine were injected along the injection tract.  The needle was removed and the area was reprepped with Betadine.  An 18-gauge spinal needle with the stylus in place was then introduced intra-articularly.  The stylus was removed and exchanged for 60 mL syringe but no fluid was able to be drawn from the knee. The needle was then withdrawn, the area was cleansed and a sterile bandage was applied. The patient tolerated the procedure well.     Orders:  -     DRAIN/INJECT LARGE JOINT/BURSA  -     lidocaine 1 % injection 2 mL; 2 mL, Intra-artICUlar, ONCE, 1 dose, On u 7/10/25 at 1615            HPI:  Ayesha Spencer is a 69 y.o. female with chief complaint of   Chief Complaint   Patient presents with    Follow-up     Left knee pain        Patient presents today with left knee pain since June 2024. She denies any injuries. Patient has gotten good relief from over-the-counter anti-inflammatories such as ibuprofen.  Other than that she has received no treatment for her knee pain.  She reports that she is very active and has increased pain with going up and down stairs and standing for more than 15 minutes.  She also ices her knee and does get some relief from this.  She reports intermittent swelling and increased aching at night.  She also reports that her knee will occasionally feel like it gives out on her.  She locates her pain at the upper outer portion of her knee

## 2025-07-15 ENCOUNTER — TELEPHONE (OUTPATIENT)
Age: 70
End: 2025-07-15

## 2025-07-15 NOTE — TELEPHONE ENCOUNTER
Patient wants to inform CLAY Wagoner that she can't take Aleve because it's an NSAID.     She's been taking Ibuprofen and it has been helping but she needs to know if that's okay and if not,  what else does CLAY Wagoner suggests.    Patient can be reached at [phone].

## 2025-07-22 ENCOUNTER — OFFICE VISIT (OUTPATIENT)
Dept: FAMILY MEDICINE CLINIC | Facility: CLINIC | Age: 70
End: 2025-07-22
Payer: MEDICARE

## 2025-07-22 VITALS
HEART RATE: 105 BPM | RESPIRATION RATE: 16 BRPM | WEIGHT: 169.8 LBS | BODY MASS INDEX: 28.99 KG/M2 | OXYGEN SATURATION: 93 % | HEIGHT: 64 IN | DIASTOLIC BLOOD PRESSURE: 60 MMHG | TEMPERATURE: 98.1 F | SYSTOLIC BLOOD PRESSURE: 125 MMHG

## 2025-07-22 DIAGNOSIS — E78.2 MIXED HYPERLIPIDEMIA: Primary | ICD-10-CM

## 2025-07-22 PROCEDURE — 3078F DIAST BP <80 MM HG: CPT | Performed by: STUDENT IN AN ORGANIZED HEALTH CARE EDUCATION/TRAINING PROGRAM

## 2025-07-22 PROCEDURE — 3074F SYST BP LT 130 MM HG: CPT | Performed by: STUDENT IN AN ORGANIZED HEALTH CARE EDUCATION/TRAINING PROGRAM

## 2025-07-22 PROCEDURE — 99213 OFFICE O/P EST LOW 20 MIN: CPT | Performed by: STUDENT IN AN ORGANIZED HEALTH CARE EDUCATION/TRAINING PROGRAM

## 2025-07-22 PROCEDURE — 1123F ACP DISCUSS/DSCN MKR DOCD: CPT | Performed by: STUDENT IN AN ORGANIZED HEALTH CARE EDUCATION/TRAINING PROGRAM

## 2025-07-22 ASSESSMENT — ENCOUNTER SYMPTOMS
SINUS PAIN: 0
SHORTNESS OF BREATH: 0
CHEST TIGHTNESS: 0

## 2025-07-22 NOTE — PROGRESS NOTES
[facility]    HISTORY OF PRESENT ILLNESS  Ayesha Spencer  is a 69 y.o. y.o. female here for follow-up    HLD  -follows cardiology  -started zetia, tolerating well  -unable tolerate multiple different statins  -Hx of T2DM  -pt looking for information about this new medication and wants to know if it has had positive effect on her HLD at this point    Mr#: [id number]      Past Medical History:   Diagnosis Date    Cancer (HCC) 2021    Hypertension     Kidney disease     Kidney stone        Past Surgical History:   Procedure Laterality Date    BREAST SURGERY      TUBAL LIGATION         Family History   Problem Relation Age of Onset    Breast Cancer Sister        Allergies   Allergen Reactions    Amoxicillin-Pot Clavulanate Diarrhea    Doxycycline Diarrhea    Corticosteroids Hives and Other (See Comments)    Naproxen Hives and Other (See Comments)    Pseudoephedrine-Guaifenesin Hives    Statins Other (See Comments) and Palpitations    Colesevelam Other (See Comments)    Ezetimibe Other (See Comments)    Moxifloxacin Other (See Comments)    Nsaids Other (See Comments)    Phenylephrine-Guaifenesin Itching    Sitagliptin Other (See Comments)       Social History     Tobacco Use   Smoking Status Former    Current packs/day: 0.00    Types: Cigarettes    Quit date:     Years since quittin.5    Passive exposure: Never   Smokeless Tobacco Former       Social History     Substance and Sexual Activity   Alcohol Use Not Currently       Immunization History   Administered Date(s) Administered    COVID-19, PFIZER PURPLE top, DILUTE for use, (age 12 y+), 30mcg/0.3mL 2021, 2021    Influenza Virus Vaccine 2007, 10/30/2008, 2009, 2010, 2011, 2012, 10/17/2013, 10/10/2016, 2017, 2018, 2020, 2021    Influenza, FLUAD, (age 65 y+), IM, Quadv, 0.5mL 2023, 2024    Influenza, FLUZONE High Dose (age 65 y+), IM, Quadv, 0.7mL

## 2025-07-22 NOTE — TELEPHONE ENCOUNTER
Attempted to call patient and left generic voicemail. If patient returns call informed patient of CLAY Dye message...        Advil (ibuprofen) and aleve (naprosyn) are both anti-inflammatories/ NSAIDs. If it is an allergy/ inability to tolerate NSAIDs she should not take advil either. If it is an allergy to naprosyn specifically, we can try meloxicam or diclofenac which are both anti-inflammatories.

## 2025-07-22 NOTE — PROGRESS NOTES
Ayesha Spencer is a 69 y.o. female (: 1955) presenting to address:    Chief Complaint   Patient presents with    Medication Check       Vitals:    25 1314   BP: 125/60   Pulse: (!) 105   Resp: 16   Temp: 98.1 °F (36.7 °C)   SpO2: 93%       \"Have you been to the ER, urgent care clinic since your last visit?  Hospitalized since your last visit?\"    NO    “Have you seen or consulted any other health care providers outside of [facility] since your last visit?”    NO    “Have you had a colorectal cancer screening such as a colonoscopy/FIT/Cologuard?    NO    No colonoscopy on file  No cologuard on file  No FIT/FOBT on file   No flexible sigmoidoscopy on file

## 2025-07-30 NOTE — TELEPHONE ENCOUNTER
Requested Prescriptions     Pending Prescriptions Disp Refills    metFORMIN (GLUCOPHAGE) 500 MG tablet 360 tablet 1     Sig: Take 2 tablets by mouth 2 times daily (with meals) Indications: Diabetes

## 2025-08-05 RX ORDER — ATENOLOL 50 MG/1
50 TABLET ORAL DAILY
Qty: 90 TABLET | Refills: 0 | Status: SHIPPED | OUTPATIENT
Start: 2025-08-05

## 2025-08-18 ENCOUNTER — TELEPHONE (OUTPATIENT)
Age: 70
End: 2025-08-18

## 2025-08-18 DIAGNOSIS — M17.12 PRIMARY OSTEOARTHRITIS OF LEFT KNEE: Primary | ICD-10-CM

## 2025-08-22 ENCOUNTER — TELEPHONE (OUTPATIENT)
Age: 70
End: 2025-08-22

## 2025-08-25 ENCOUNTER — TELEPHONE (OUTPATIENT)
Age: 70
End: 2025-08-25

## 2025-08-25 DIAGNOSIS — M25.562 CHRONIC PAIN OF LEFT KNEE: ICD-10-CM

## 2025-08-25 DIAGNOSIS — M17.12 PRIMARY OSTEOARTHRITIS OF LEFT KNEE: Primary | ICD-10-CM

## 2025-08-25 DIAGNOSIS — G89.29 CHRONIC PAIN OF LEFT KNEE: ICD-10-CM

## 2025-08-26 ENCOUNTER — TELEPHONE (OUTPATIENT)
Age: 70
End: 2025-08-26